# Patient Record
Sex: FEMALE | Race: BLACK OR AFRICAN AMERICAN | NOT HISPANIC OR LATINO | Employment: OTHER | URBAN - METROPOLITAN AREA
[De-identification: names, ages, dates, MRNs, and addresses within clinical notes are randomized per-mention and may not be internally consistent; named-entity substitution may affect disease eponyms.]

---

## 2023-02-24 ENCOUNTER — DOCUMENTATION (OUTPATIENT)
Dept: PRIMARY CARE | Facility: CLINIC | Age: 78
End: 2023-02-24
Payer: MEDICARE

## 2023-03-01 PROBLEM — H90.3 BILATERAL SENSORINEURAL HEARING LOSS: Status: ACTIVE | Noted: 2023-03-01

## 2023-03-01 PROBLEM — M54.2 CHRONIC NECK PAIN: Status: ACTIVE | Noted: 2023-03-01

## 2023-03-01 PROBLEM — E11.9 DIABETES MELLITUS (MULTI): Status: ACTIVE | Noted: 2023-03-01

## 2023-03-01 PROBLEM — R06.00 DYSPNEA: Status: ACTIVE | Noted: 2023-03-01

## 2023-03-01 PROBLEM — H25.812 COMBINED FORMS OF AGE-RELATED CATARACT, LEFT EYE: Status: ACTIVE | Noted: 2023-03-01

## 2023-03-01 PROBLEM — R79.9 ABNORMAL BLOOD CHEMISTRY: Status: ACTIVE | Noted: 2023-03-01

## 2023-03-01 PROBLEM — D64.9 ANEMIA: Status: ACTIVE | Noted: 2023-03-01

## 2023-03-01 PROBLEM — N17.9 AKI (ACUTE KIDNEY INJURY) (CMS-HCC): Status: ACTIVE | Noted: 2023-03-01

## 2023-03-01 PROBLEM — R93.1 ABNORMAL ECHOCARDIOGRAM: Status: ACTIVE | Noted: 2023-03-01

## 2023-03-01 PROBLEM — H61.23 BILATERAL IMPACTED CERUMEN: Status: ACTIVE | Noted: 2023-03-01

## 2023-03-01 PROBLEM — E11.42 DIABETIC PERIPHERAL NEUROPATHY (MULTI): Status: ACTIVE | Noted: 2023-03-01

## 2023-03-01 PROBLEM — G89.29 CHRONIC NECK PAIN: Status: ACTIVE | Noted: 2023-03-01

## 2023-03-01 PROBLEM — R45.89 DEPRESSED MOOD: Status: ACTIVE | Noted: 2023-03-01

## 2023-03-01 PROBLEM — G89.29 CHRONIC PAIN: Status: ACTIVE | Noted: 2023-03-01

## 2023-03-01 PROBLEM — M19.90 ARTHROSIS OF MULTIPLE SITES: Status: ACTIVE | Noted: 2023-03-01

## 2023-03-01 PROBLEM — F41.8 DEPRESSION WITH ANXIETY: Status: ACTIVE | Noted: 2023-03-01

## 2023-03-01 PROBLEM — R42 DIZZINESS: Status: ACTIVE | Noted: 2023-03-01
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
## 2023-03-01 PROBLEM — R05.9 COUGH: Status: ACTIVE | Noted: 2023-03-01

## 2023-03-01 PROBLEM — H25.813 COMBINED FORM OF AGE-RELATED CATARACT, BOTH EYES: Status: ACTIVE | Noted: 2023-03-01

## 2023-03-02 PROBLEM — G30.1 LATE ONSET ALZHEIMER'S DEMENTIA WITHOUT BEHAVIORAL DISTURBANCE (MULTI): Status: ACTIVE | Noted: 2023-03-02

## 2023-03-02 PROBLEM — H91.90 HEARING LOSS: Status: ACTIVE | Noted: 2023-03-02

## 2023-03-02 PROBLEM — M47.816 LUMBAR SPONDYLOSIS: Status: ACTIVE | Noted: 2023-03-02

## 2023-03-02 PROBLEM — H61.21 IMPACTED CERUMEN OF RIGHT EAR: Status: ACTIVE | Noted: 2023-03-02

## 2023-03-02 PROBLEM — L65.9 HAIR LOSS: Status: ACTIVE | Noted: 2023-03-02

## 2023-03-02 PROBLEM — Z96.1 PSEUDOPHAKIA OF BOTH EYES: Status: ACTIVE | Noted: 2023-03-02

## 2023-03-02 PROBLEM — M05.00: Status: ACTIVE | Noted: 2023-03-02

## 2023-03-02 PROBLEM — E78.5 HYPERLIPIDEMIA LDL GOAL <100: Status: ACTIVE | Noted: 2023-03-02

## 2023-03-02 PROBLEM — G62.9 NEUROPATHY: Status: ACTIVE | Noted: 2023-03-02

## 2023-03-02 PROBLEM — M85.89 OTHER SPECIFIED DISORDERS OF BONE DENSITY AND STRUCTURE, MULTIPLE SITES: Status: ACTIVE | Noted: 2023-03-02

## 2023-03-02 PROBLEM — E55.9 VITAMIN D DEFICIENCY: Status: ACTIVE | Noted: 2023-03-02

## 2023-03-02 PROBLEM — M48.062 LUMBAR STENOSIS WITH NEUROGENIC CLAUDICATION: Status: ACTIVE | Noted: 2023-03-02

## 2023-03-02 PROBLEM — R32 URINARY INCONTINENCE, UNSPECIFIED TYPE: Status: ACTIVE | Noted: 2023-03-02

## 2023-03-02 PROBLEM — F02.80 LATE ONSET ALZHEIMER'S DEMENTIA WITHOUT BEHAVIORAL DISTURBANCE (MULTI): Status: ACTIVE | Noted: 2023-03-02

## 2023-03-02 PROBLEM — M06.9 RHEUMATOID ARTHRITIS (MULTI): Status: ACTIVE | Noted: 2023-03-02

## 2023-03-02 PROBLEM — E78.5 HYPERLIPIDEMIA: Status: ACTIVE | Noted: 2023-03-02

## 2023-03-02 PROBLEM — B35.1 ONYCHOMYCOSIS: Status: ACTIVE | Noted: 2023-03-02

## 2023-03-02 PROBLEM — M79.672 LEFT FOOT PAIN: Status: ACTIVE | Noted: 2023-03-02

## 2023-03-02 PROBLEM — I10 HYPERTENSION: Status: ACTIVE | Noted: 2023-03-02

## 2023-03-02 PROBLEM — N18.2 STAGE 2 CHRONIC KIDNEY DISEASE: Status: ACTIVE | Noted: 2023-03-02

## 2023-03-02 PROBLEM — M96.1 LUMBAR POST-LAMINECTOMY SYNDROME: Status: ACTIVE | Noted: 2023-03-02

## 2023-03-02 PROBLEM — R61 EXCESSIVE SWEATING: Status: ACTIVE | Noted: 2023-03-02

## 2023-03-02 PROBLEM — M54.50 LOW BACK PAIN: Status: ACTIVE | Noted: 2023-03-02

## 2023-03-02 PROBLEM — E66.9 OBESITY: Status: ACTIVE | Noted: 2023-03-02

## 2023-03-02 PROBLEM — M25.552 LEFT HIP PAIN: Status: ACTIVE | Noted: 2023-03-02

## 2023-03-02 PROBLEM — M06.30 RHEUMATOID NODULES (MULTI): Status: ACTIVE | Noted: 2023-03-02

## 2023-03-02 PROBLEM — R29.898 LOWER EXTREMITY WEAKNESS: Status: ACTIVE | Noted: 2023-03-02

## 2023-03-02 PROBLEM — M54.17 LUMBOSACRAL NEURITIS: Status: ACTIVE | Noted: 2023-03-02

## 2023-03-02 PROBLEM — R21 RASH: Status: ACTIVE | Noted: 2023-03-02

## 2023-03-02 PROBLEM — R26.81 GAIT INSTABILITY: Status: ACTIVE | Noted: 2023-03-02

## 2023-03-02 PROBLEM — L98.9 NODULAR LESION ON SURFACE OF SKIN: Status: ACTIVE | Noted: 2023-03-02

## 2023-03-02 PROBLEM — M19.90 OSTEOARTHRITIS: Status: ACTIVE | Noted: 2023-03-02

## 2023-03-02 RX ORDER — MECLIZINE HCL 12.5 MG 12.5 MG/1
12.5 TABLET ORAL 3 TIMES DAILY PRN
COMMUNITY
Start: 2022-04-06 | End: 2023-04-04 | Stop reason: SDUPTHER

## 2023-03-02 RX ORDER — SERTRALINE HYDROCHLORIDE 100 MG/1
1 TABLET, FILM COATED ORAL DAILY
COMMUNITY
Start: 2012-07-23 | End: 2023-03-14 | Stop reason: SDUPTHER

## 2023-03-02 RX ORDER — GABAPENTIN 300 MG/1
1 CAPSULE ORAL 3 TIMES DAILY
COMMUNITY
Start: 2022-11-18

## 2023-03-02 RX ORDER — AMLODIPINE BESYLATE 10 MG/1
1 TABLET ORAL DAILY
COMMUNITY
Start: 2022-11-18 | End: 2024-01-08 | Stop reason: SDUPTHER

## 2023-03-02 RX ORDER — HYDROXYCHLOROQUINE SULFATE 200 MG/1
1 TABLET, FILM COATED ORAL
COMMUNITY
Start: 2012-05-08 | End: 2024-01-08 | Stop reason: SDUPTHER

## 2023-03-02 RX ORDER — BENZONATATE 100 MG/1
100 CAPSULE ORAL
COMMUNITY
Start: 2022-12-12 | End: 2024-03-04 | Stop reason: ALTCHOICE

## 2023-03-02 RX ORDER — METOPROLOL TARTRATE 25 MG/1
1 TABLET, FILM COATED ORAL 2 TIMES DAILY
COMMUNITY
Start: 2017-11-16 | End: 2023-11-21 | Stop reason: SDUPTHER

## 2023-03-02 RX ORDER — METFORMIN HYDROCHLORIDE 500 MG/1
1 TABLET ORAL DAILY
COMMUNITY
Start: 2019-03-13 | End: 2023-04-04 | Stop reason: SDUPTHER

## 2023-03-02 RX ORDER — OXYBUTYNIN CHLORIDE 5 MG/1
1 TABLET, EXTENDED RELEASE ORAL DAILY
COMMUNITY
Start: 2022-06-27 | End: 2024-01-08 | Stop reason: SDUPTHER

## 2023-03-02 RX ORDER — BLOOD-GLUCOSE METER
KIT MISCELLANEOUS
COMMUNITY
Start: 2022-05-09 | End: 2024-03-19 | Stop reason: SDDI

## 2023-03-02 RX ORDER — LEFLUNOMIDE 10 MG/1
1 TABLET ORAL DAILY
COMMUNITY
Start: 2017-02-08 | End: 2024-03-04 | Stop reason: ALTCHOICE

## 2023-03-02 RX ORDER — ALBUTEROL SULFATE 90 UG/1
2 AEROSOL, METERED RESPIRATORY (INHALATION) 4 TIMES DAILY PRN
COMMUNITY
Start: 2022-07-01 | End: 2024-01-08 | Stop reason: SDUPTHER

## 2023-03-02 RX ORDER — ATORVASTATIN CALCIUM 40 MG/1
1 TABLET, FILM COATED ORAL EVERY EVENING
COMMUNITY
Start: 2016-08-15 | End: 2023-07-06 | Stop reason: SDUPTHER

## 2023-03-06 ENCOUNTER — SOCIAL WORK (OUTPATIENT)
Dept: PRIMARY CARE | Facility: CLINIC | Age: 78
End: 2023-03-06
Payer: MEDICARE

## 2023-03-06 ASSESSMENT — ANXIETY QUESTIONNAIRES
6. BECOMING EASILY ANNOYED OR IRRITABLE: SEVERAL DAYS
IF YOU CHECKED OFF ANY PROBLEMS ON THIS QUESTIONNAIRE, HOW DIFFICULT HAVE THESE PROBLEMS MADE IT FOR YOU TO DO YOUR WORK, TAKE CARE OF THINGS AT HOME, OR GET ALONG WITH OTHER PEOPLE: SOMEWHAT DIFFICULT
4. TROUBLE RELAXING: NOT AT ALL
2. NOT BEING ABLE TO STOP OR CONTROL WORRYING: SEVERAL DAYS
7. FEELING AFRAID AS IF SOMETHING AWFUL MIGHT HAPPEN: SEVERAL DAYS
3. WORRYING TOO MUCH ABOUT DIFFERENT THINGS: SEVERAL DAYS
GAD7 TOTAL SCORE: 5
1. FEELING NERVOUS, ANXIOUS, OR ON EDGE: SEVERAL DAYS
5. BEING SO RESTLESS THAT IT IS HARD TO SIT STILL: NOT AT ALL

## 2023-03-06 ASSESSMENT — PATIENT HEALTH QUESTIONNAIRE - PHQ9
SUM OF ALL RESPONSES TO PHQ QUESTIONS 1-9: 9
1. LITTLE INTEREST OR PLEASURE IN DOING THINGS: SEVERAL DAYS
3. TROUBLE FALLING OR STAYING ASLEEP: SEVERAL DAYS
2. FEELING DOWN, DEPRESSED OR HOPELESS: MORE THAN HALF THE DAYS
8. MOVING OR SPEAKING SO SLOWLY THAT OTHER PEOPLE COULD HAVE NOTICED. OR THE OPPOSITE, BEING SO FIGETY OR RESTLESS THAT YOU HAVE BEEN MOVING AROUND A LOT MORE THAN USUAL: SEVERAL DAYS
SUM OF ALL RESPONSES TO PHQ9 QUESTIONS 1 & 2: 3
6. FEELING BAD ABOUT YOURSELF - OR THAT YOU ARE A FAILURE OR HAVE LET YOURSELF OR YOUR FAMILY DOWN: SEVERAL DAYS
4. FEELING TIRED OR HAVING LITTLE ENERGY: SEVERAL DAYS
10. IF YOU CHECKED OFF ANY PROBLEMS, HOW DIFFICULT HAVE THESE PROBLEMS MADE IT FOR YOU TO DO YOUR WORK, TAKE CARE OF THINGS AT HOME, OR GET ALONG WITH OTHER PEOPLE: SOMEWHAT DIFFICULT
5. POOR APPETITE OR OVEREATING: SEVERAL DAYS
9. THOUGHTS THAT YOU WOULD BE BETTER OFF DEAD, OR OF HURTING YOURSELF: NOT AT ALL
7. TROUBLE CONCENTRATING ON THINGS, SUCH AS READING THE NEWSPAPER OR WATCHING TELEVISION: SEVERAL DAYS
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
## 2023-03-14 DIAGNOSIS — F41.9 ANXIETY: Primary | ICD-10-CM

## 2023-03-14 DIAGNOSIS — F41.8 DEPRESSION WITH ANXIETY: Primary | ICD-10-CM

## 2023-03-14 RX ORDER — SERTRALINE HYDROCHLORIDE 100 MG/1
100 TABLET, FILM COATED ORAL DAILY
Qty: 90 TABLET | Refills: 1 | Status: SHIPPED | OUTPATIENT
Start: 2023-03-14 | End: 2024-01-08 | Stop reason: SDUPTHER

## 2023-03-16 ENCOUNTER — DOCUMENTATION (OUTPATIENT)
Dept: BEHAVIORAL HEALTH | Facility: CLINIC | Age: 78
End: 2023-03-16
Payer: MEDICARE

## 2023-03-16 ENCOUNTER — TELEPHONE (OUTPATIENT)
Dept: PRIMARY CARE | Facility: CLINIC | Age: 78
End: 2023-03-16
Payer: MEDICARE

## 2023-03-16 NOTE — PROGRESS NOTES
[facility] outreach to patient for update on program. Patient to continue with CCM RN,Sasha and Patient Navigator Bev.

## 2023-03-16 NOTE — PROGRESS NOTES
[facility] Psychiatry Consult Note     Toyin Oropeza is a 78 y.o., referred to Collaborative Care for symptoms of depression and anxiety. I have reviewed the patient with the behavioral health manager and reviewed the patient's electronic record.    Recommendations:   Pt with advancing medical problems and unspecified dementia, unable to care for self at home. Needs help with ADLs. While she does have high distress in this context, her level of distress seems appropriate to the situation. Agree with case management for assistance with living situation, but no role for psychiatry or collaborative care at this time. Pharmacologic intervention would be more likely to complicate the situation in this context.      Patient Health Questionnaire-9 Score: 9 (3/6/2023  1:50 PM)  GABRIELLA-7 Total Score: 5 (3/6/2023  1:48 PM)      The above treatment considerations and suggestions are based on consultations with the patient's care manager and a review of information available in the electronic medical record. I have not personally examined the patient. All recommendations should be implemented with consideration of the patient's relevant prior history and current clinical status. Please feel free to call me with any questions about the care of this patient. I can easily be reached through [employer], or office email

## 2023-03-17 ENCOUNTER — TELEPHONE (OUTPATIENT)
Dept: PRIMARY CARE | Facility: CLINIC | Age: 78
End: 2023-03-17
Payer: MEDICARE

## 2023-03-21 ENCOUNTER — APPOINTMENT (OUTPATIENT)
Dept: PRIMARY CARE | Facility: CLINIC | Age: 78
End: 2023-03-21
Payer: MEDICARE

## 2023-03-22 ENCOUNTER — TELEPHONE (OUTPATIENT)
Dept: PRIMARY CARE | Facility: CLINIC | Age: 78
End: 2023-03-22
Payer: MEDICARE

## 2023-03-23 ENCOUNTER — PATIENT OUTREACH (OUTPATIENT)
Dept: PRIMARY CARE | Facility: CLINIC | Age: 78
End: 2023-03-23
Payer: MEDICARE

## 2023-03-23 ENCOUNTER — PATIENT OUTREACH (OUTPATIENT)
Dept: CARE COORDINATION | Facility: CLINIC | Age: 78
End: 2023-03-23

## 2023-03-23 DIAGNOSIS — E78.5 HYPERLIPIDEMIA, UNSPECIFIED HYPERLIPIDEMIA TYPE: ICD-10-CM

## 2023-03-23 DIAGNOSIS — I10 ESSENTIAL HYPERTENSION, BENIGN: ICD-10-CM

## 2023-03-23 DIAGNOSIS — E11.9 TYPE 2 DIABETES MELLITUS WITHOUT COMPLICATION, UNSPECIFIED WHETHER LONG TERM INSULIN USE (MULTI): ICD-10-CM

## 2023-03-23 PROCEDURE — 99490 CHRNC CARE MGMT STAFF 1ST 20: CPT | Performed by: STUDENT IN AN ORGANIZED HEALTH CARE EDUCATION/TRAINING PROGRAM

## 2023-03-23 PROCEDURE — 99439 CHRNC CARE MGMT STAF EA ADDL: CPT | Performed by: STUDENT IN AN ORGANIZED HEALTH CARE EDUCATION/TRAINING PROGRAM

## 2023-03-23 NOTE — PROGRESS NOTES
Called [employer] to reopen case and reschedule assessment.  Called Ms. Sandip to follow up she said that she did agree to keep her assessment appt. For 3/30/23.

## 2023-04-04 DIAGNOSIS — E11.9 TYPE 2 DIABETES MELLITUS WITHOUT COMPLICATION, UNSPECIFIED WHETHER LONG TERM INSULIN USE (MULTI): Primary | ICD-10-CM

## 2023-04-04 DIAGNOSIS — R42 DIZZINESS: ICD-10-CM

## 2023-04-05 ENCOUNTER — PATIENT OUTREACH (OUTPATIENT)
Dept: CARE COORDINATION | Facility: CLINIC | Age: 78
End: 2023-04-05
Payer: MEDICARE

## 2023-04-05 NOTE — PROGRESS NOTES
Spoke with Ms. Oropeza today regarding her Assisted living assessment she had with the [facility]. Ms. Oropeza now wants [facility] in-home care services instead of Assisted living. I called the intake dept at [facility] to see if that can be switched over for her. Flora at [facility] said she would work on the switch,  and reach out to Ms. Oropeza to let her know the next steps.

## 2023-04-06 RX ORDER — METFORMIN HYDROCHLORIDE 500 MG/1
500 TABLET ORAL DAILY
Qty: 90 TABLET | Refills: 1 | Status: SHIPPED | OUTPATIENT
Start: 2023-04-06 | End: 2024-01-08 | Stop reason: SDUPTHER

## 2023-04-06 RX ORDER — MECLIZINE HCL 12.5 MG 12.5 MG/1
12.5 TABLET ORAL 3 TIMES DAILY PRN
Qty: 90 TABLET | Refills: 1 | Status: SHIPPED | OUTPATIENT
Start: 2023-04-06 | End: 2024-01-30 | Stop reason: SDUPTHER

## 2023-04-07 ENCOUNTER — DOCUMENTATION (OUTPATIENT)
Dept: PRIMARY CARE | Facility: CLINIC | Age: 78
End: 2023-04-07
Payer: MEDICARE

## 2023-04-07 DIAGNOSIS — F32.A DEPRESSION, UNSPECIFIED DEPRESSION TYPE: Primary | ICD-10-CM

## 2023-04-07 PROCEDURE — G2214 INIT/SUB PSYCH CARE M 1ST 30: HCPCS | Performed by: STUDENT IN AN ORGANIZED HEALTH CARE EDUCATION/TRAINING PROGRAM

## 2023-04-12 ENCOUNTER — PATIENT OUTREACH (OUTPATIENT)
Dept: CARE COORDINATION | Facility: CLINIC | Age: 78
End: 2023-04-12
Payer: MEDICARE

## 2023-04-12 NOTE — PROGRESS NOTES
Ms. Oropeza called in today, asked if I could contact [employer] (in home services) to check the status of her case.   I called and left a message with the intake dept. to call me back.

## 2023-04-13 ENCOUNTER — PATIENT OUTREACH (OUTPATIENT)
Dept: CARE COORDINATION | Facility: CLINIC | Age: 78
End: 2023-04-13
Payer: MEDICARE

## 2023-04-13 ENCOUNTER — PATIENT OUTREACH (OUTPATIENT)
Dept: PRIMARY CARE | Facility: CLINIC | Age: 78
End: 2023-04-13
Payer: MEDICARE

## 2023-04-13 DIAGNOSIS — I10 ESSENTIAL HYPERTENSION, BENIGN: ICD-10-CM

## 2023-04-13 DIAGNOSIS — F32.A DEPRESSION, UNSPECIFIED DEPRESSION TYPE: ICD-10-CM

## 2023-04-13 DIAGNOSIS — E78.5 HYPERLIPIDEMIA, UNSPECIFIED HYPERLIPIDEMIA TYPE: ICD-10-CM

## 2023-04-13 DIAGNOSIS — E11.9 TYPE 2 DIABETES MELLITUS WITHOUT COMPLICATION, UNSPECIFIED WHETHER LONG TERM INSULIN USE (MULTI): ICD-10-CM

## 2023-04-13 NOTE — PROGRESS NOTES
"Spoke w/ pt  Pt identified by name and     LOV: 23, /85, HR 78, A1C 5.5%  NOV: 23    Treatment Goals    For high blood pressure:   Treatment goals include:  [place]/continue prudent diet and regular exercise.   Blood Pressure Treatment goals include:   BP of 120/80, with no higher than 140/85 on consistent basis.   Exercise at least 3-4 days a week for at least 30 minutes  Eat a balanced diet, rich in fruits and vegetables, low in sodium and processed foods.  If you are overweight, work toward a goal BMI of less than 25, with short-term goal of 10 pound weight loss.    For diabetes:   Treatment goals include:  HgbA1C less than 7.0%  Fasting B - 130 mg/dL  Postprandial BG: less than 180 mg/dL  /80 on consistent basis, with no higher than 140/85  LDL cholesterol less than 100, and HDL cholesterol above 40.  Yearly retinal exam  Check feet periodically for sores or decreased sensation  Exercise at least 3-4 days a week for at least 30 minutes  Work toward a goal BMI of less than 25, although in the shorter term, losing even 10 pounds will help.     For Cholesterol:   Treatment goals include:  HIGHER IN FRUITS AND VEGGIES AND WHOLE GRAIN AND LOWER IN FATTY FOODS.     Think about those things which may be affecting your ability to reach those goals, and develop a plan to overcome them.    Additional resources are available upon request to help you attain goals, including dietitian.    Informed pt, Kofijudith has been trying to reach her to discuss her [employer] Home [facility] Intake information and let her know that she needs to rtn documents asap, please rtn call to Bev at [phone] to discuss in greater detail.\"     Pt states \"she is feeling well w/ no complaints today, she was taking a nap when Bev called and happy she called, she is going to call her right now and confirms she has her number, she is taking all of her medications and no RF needed at this time, appetite has been ok, her " "brother helps her with food.\"    Thanked Ms. Oropeza for her time to speak w/ me today and informed her that I am happy to assist if she has any questions or concerns.    Pt verbalizes understanding and agrees w/ plan of care.    Sasha Read RN      "

## 2023-04-13 NOTE — PROGRESS NOTES
I spoke with Ms. Oropeza yesterday she called me to assist regarding her [employer]  in- home care application. I called [employer] intake to check on this. I was told the application is in progress. [employer] requires she apply for Medicaid through the [facility] which she has done, but they need to have her verification list returned to them along with the supporting documentation they request by mail. I called Ms. Oropeza today to stress the importance of returning these documents when she receives them, she did not answer and her VM is full. I follow up in a couple days.   I also sent a message to the Nurse MGR Baez to follow up with Ms. Oropeza also.    FYI she has been referred by the [employer] intake  to Leonel Burgos for mental health support.

## 2023-04-14 ENCOUNTER — PATIENT OUTREACH (OUTPATIENT)
Dept: PRIMARY CARE | Facility: CLINIC | Age: 78
End: 2023-04-14
Payer: MEDICARE

## 2023-04-14 DIAGNOSIS — I10 ESSENTIAL HYPERTENSION, BENIGN: ICD-10-CM

## 2023-04-14 DIAGNOSIS — E11.9 TYPE 2 DIABETES MELLITUS WITHOUT COMPLICATION, UNSPECIFIED WHETHER LONG TERM INSULIN USE (MULTI): ICD-10-CM

## 2023-04-14 NOTE — PROGRESS NOTES
"Spoke w/ pt  Pt identified by name and      Pt is crying w/ a shaky voice and states \"she is having a bad headache and confusion, she fell and hit her head on the left side this morning, she is having a lot of confusion, she is trying to piece everything together, she went to Dr. D'Amico for her visit yesterday, she can't remember what happened, she is real shaky, she dropped her hot coffee on the floor.\"     She can tell me her full name, , address, day and time, however she admits that \"she is shaky and having a lot of confusion, she has not taken her medication today because she is shaking too bad, cannot check her BS due to she is too shaky, she last ate- last night, a can of peaches and nothing since then, she cannot get her phone to call out or her tv to turn on, she can't remember what Dr. D'Amico told her yesterday when she went to his office.\"     Attempt made to reach pt's brother Antonio (EC) at [phone] and her sister Anat (EC) at [phone] both did not answer.      Advised pt: seek immediate medical attention now 911 and she should stay on the phone w/ me while I call 911 to send them to her address.     Pt verbalizes understanding and agrees w/ plan.     911 called and pt remained on the phone w/ me while I called 911, 911 arrived to pt, SBAR given to 911 and they informed me they agree w/ plan to take pt to the ED now.     Dr. D'Amico notified.     Sasha Read RN     "

## 2023-04-14 NOTE — PROGRESS NOTES
"Spoke w/ pt  Pt identified by name and     Pt is crying w/ a shaky voice and states \"she is having a bad headache and confusion, she fell and hit her head on the left side this morning, she is having a lot of confusion, she is trying to piece everything together, she went to Dr. D'Amico for her visit yesterday, she can't remember what happened, she is real shaky, she dropped her hot coffee on the floor.\"    She can tell me her full name, , address, day and time, however she admits that \"she is shaky and having a lot of confusion, she has not taken her medication today because she is shaking too bad, cannot check her BS due to she is too shaky, she last ate- last night, a can of peaches and nothing since then, she cannot get her phone to call out or her tv to turn on, she can't remember what Dr. D'Amico told her yesterday when she went to his office.\"    Attempt made to reach pt's brother Antonio (EC) at [phone] and her sister Anat (EC) at [phone] both did not answer.     Advised pt: seek immediate medical attention now 911 and she should stay on the phone w/ me while I call 911 to send them to her address.    Pt verbalizes understanding and agrees w/ plan.    911 called and pt remained on the phone w/ me while I called 911, 911 arrived to pt, SBAR given to 911 and they informed me they agree w/ plan to take pt to the ED now.    Dr. D'Amico notified.    Sasha Read RN    "

## 2023-04-17 ENCOUNTER — DOCUMENTATION (OUTPATIENT)
Dept: PRIMARY CARE | Facility: CLINIC | Age: 78
End: 2023-04-17
Payer: MEDICARE

## 2023-04-17 ENCOUNTER — PATIENT OUTREACH (OUTPATIENT)
Dept: PRIMARY CARE | Facility: CLINIC | Age: 78
End: 2023-04-17
Payer: MEDICARE

## 2023-04-17 DIAGNOSIS — E11.9 TYPE 2 DIABETES MELLITUS WITHOUT COMPLICATION, UNSPECIFIED WHETHER LONG TERM INSULIN USE (MULTI): ICD-10-CM

## 2023-04-17 DIAGNOSIS — I10 ESSENTIAL HYPERTENSION, BENIGN: ICD-10-CM

## 2023-04-17 NOTE — PROGRESS NOTES
"Discharge facility: [facility]  Discharge diagnosis: UTI  Admission date: 4/14/2023   Discharge date:  4/16/2023    First attempt made to reach pt at pt's listed contact number on 4/17/23 and recording states \"this mailbox is not accepting VM.\"    Will  continue to try to reach pt for TCM/CCM outreach.    Sasha Read RN      "

## 2023-04-18 NOTE — PROGRESS NOTES
"Discharge facility: [facility]  Discharge diagnosis: UTI  Admission date: 4/14/2023   Discharge date:  4/16/2023    Second attempt made to reach pt at pt's listed contact number on 4/18/23 and recording states \"this mailbox is not accepting VM.\"    Sasha Read RN  "

## 2023-04-19 ENCOUNTER — PATIENT OUTREACH (OUTPATIENT)
Dept: PRIMARY CARE | Facility: CLINIC | Age: 78
End: 2023-04-19
Payer: MEDICARE

## 2023-04-19 NOTE — PROGRESS NOTES
Discharge facility: [facility]  Discharge diagnosis: Weakness, UTI   Admission date: 4/14/23  Discharge date: 4/19/23 to SNF

## 2023-05-08 ENCOUNTER — PATIENT OUTREACH (OUTPATIENT)
Dept: PRIMARY CARE | Facility: CLINIC | Age: 78
End: 2023-05-08
Payer: MEDICARE

## 2023-05-08 DIAGNOSIS — I10 ESSENTIAL HYPERTENSION, BENIGN: ICD-10-CM

## 2023-05-08 DIAGNOSIS — E11.9 TYPE 2 DIABETES MELLITUS WITHOUT COMPLICATION, UNSPECIFIED WHETHER LONG TERM INSULIN USE (MULTI): ICD-10-CM

## 2023-05-08 NOTE — PROGRESS NOTES
LOV: 23, /85, HR 78, A1C 5.5%  NOV: 23     Treatment Goals     For high blood pressure:   Treatment goals include:  [place]/continue prudent diet and regular exercise.   Blood Pressure Treatment goals include:   BP of 120/80, with no higher than 140/85 on consistent basis.   Exercise at least 3-4 days a week for at least 30 minutes  Eat a balanced diet, rich in fruits and vegetables, low in sodium and processed foods.  If you are overweight, work toward a goal BMI of less than 25, with short-term goal of 10 pound weight loss.     For diabetes:   Treatment goals include:  HgbA1C less than 7.0%  Fasting B - 130 mg/dL  Postprandial BG: less than 180 mg/dL  /80 on consistent basis, with no higher than 140/85  LDL cholesterol less than 100, and HDL cholesterol above 40.  Yearly retinal exam  Check feet periodically for sores or decreased sensation  Exercise at least 3-4 days a week for at least 30 minutes  Work toward a goal BMI of less than 25, although in the shorter term, losing even 10 pounds will help.     For Cholesterol:   Treatment goals include:  HIGHER IN FRUITS AND VEGGIES AND WHOLE GRAIN AND LOWER IN FATTY FOODS.      Think about those things which may be affecting your ability to reach those goals, and develop a plan to overcome them.     Additional resources are available upon request to help you attain goals, including dietitian.     Sasha Read RN

## 2023-05-19 DIAGNOSIS — E51.9 THIAMINE DEFICIENCY: Primary | ICD-10-CM

## 2023-05-23 ENCOUNTER — TELEPHONE (OUTPATIENT)
Dept: PRIMARY CARE | Facility: CLINIC | Age: 78
End: 2023-05-23
Payer: MEDICARE

## 2023-05-23 NOTE — TELEPHONE ENCOUNTER
----- Message from Christopher D'Amico, DO sent at 5/19/2023  8:55 AM EDT -----  Labs were sent to me from [facility] showing low thiamine.  I assume this is from the hospital visits, and they hopefully repleted.  I put in an order for repeat lab.

## 2023-05-24 ENCOUNTER — TELEPHONE (OUTPATIENT)
Dept: PRIMARY CARE | Facility: CLINIC | Age: 78
End: 2023-05-24
Payer: MEDICARE

## 2023-05-24 NOTE — TELEPHONE ENCOUNTER
----- Message from Christopher D'Amico, DO sent at 5/19/2023  8:55 AM EDT -----  Labs were sent to me from [facility] showing low thiamine.  I assume this is from the hospital visits, and they hopefully repleted.  I put in an order for repeat lab.

## 2023-05-31 NOTE — PROGRESS NOTES
Chart review for TCM update.  Adm: 5/24/23, dx: change in mental status  DC to SNF [facility] on 5/30/23    Will not identify into TCM since dc to SNF.    Will continue to review for follow up.    Sasha Read RN

## 2023-06-08 ENCOUNTER — PATIENT OUTREACH (OUTPATIENT)
Dept: PRIMARY CARE | Facility: CLINIC | Age: 78
End: 2023-06-08
Payer: MEDICARE

## 2023-06-08 DIAGNOSIS — E11.9 TYPE 2 DIABETES MELLITUS WITHOUT COMPLICATION, UNSPECIFIED WHETHER LONG TERM INSULIN USE (MULTI): ICD-10-CM

## 2023-06-08 DIAGNOSIS — I10 ESSENTIAL HYPERTENSION, BENIGN: ICD-10-CM

## 2023-06-08 DIAGNOSIS — E78.5 HYPERLIPIDEMIA, UNSPECIFIED HYPERLIPIDEMIA TYPE: ICD-10-CM

## 2023-06-08 NOTE — PROGRESS NOTES
Discharged from [facility] on 23 to [facility]    LOV: 23, /85, HR 78, A1C 5.5%  NOV: 23     Treatment Goals     For high blood pressure:   Treatment goals include:  [place]/continue prudent diet and regular exercise.   Blood Pressure Treatment goals include:   BP of 120/80, with no higher than 140/85 on consistent basis.   Exercise at least 3-4 days a week for at least 30 minutes  Eat a balanced diet, rich in fruits and vegetables, low in sodium and processed foods.  If you are overweight, work toward a goal BMI of less than 25, with short-term goal of 10 pound weight loss.     For diabetes:   Treatment goals include:  HgbA1C less than 7.0%  Fasting B - 130 mg/dL  Postprandial BG: less than 180 mg/dL  /80 on consistent basis, with no higher than 140/85  LDL cholesterol less than 100, and HDL cholesterol above 40.  Yearly retinal exam  Check feet periodically for sores or decreased sensation  Exercise at least 3-4 days a week for at least 30 minutes  Work toward a goal BMI of less than 25, although in the shorter term, losing even 10 pounds will help.     For Cholesterol:   Treatment goals include:  HIGHER IN FRUITS AND VEGGIES AND WHOLE GRAIN AND LOWER IN FATTY FOODS.      Think about those things which may be affecting your ability to reach those goals, and develop a plan to overcome them.     Additional resources are available upon request to help you attain goals, including dietitian.     Sasha Read RN

## 2023-06-09 ENCOUNTER — APPOINTMENT (OUTPATIENT)
Dept: PRIMARY CARE | Facility: CLINIC | Age: 78
End: 2023-06-09
Payer: MEDICARE

## 2023-07-06 DIAGNOSIS — E78.00 HYPERCHOLESTEROLEMIA: ICD-10-CM

## 2023-07-06 RX ORDER — ATORVASTATIN CALCIUM 40 MG/1
40 TABLET, FILM COATED ORAL EVERY EVENING
Qty: 90 TABLET | Refills: 2 | Status: SHIPPED | OUTPATIENT
Start: 2023-07-06 | End: 2024-01-08 | Stop reason: SDUPTHER

## 2023-07-25 ENCOUNTER — PATIENT OUTREACH (OUTPATIENT)
Dept: PRIMARY CARE | Facility: CLINIC | Age: 78
End: 2023-07-25
Payer: MEDICARE

## 2023-07-25 DIAGNOSIS — I10 ESSENTIAL HYPERTENSION, BENIGN: ICD-10-CM

## 2023-07-25 DIAGNOSIS — E11.9 TYPE 2 DIABETES MELLITUS WITHOUT COMPLICATION, UNSPECIFIED WHETHER LONG TERM INSULIN USE (MULTI): ICD-10-CM

## 2023-07-25 DIAGNOSIS — E78.5 HYPERLIPIDEMIA, UNSPECIFIED HYPERLIPIDEMIA TYPE: ICD-10-CM

## 2023-07-25 NOTE — PROGRESS NOTES
Chart Review:  Upon chart review via [facility] Ms. Deal is still active x56 days at [facility].  Will continue to follow for [facility] outreach.    LOV: 23, /85, HR 78, A1C 5.5%     Treatment Goals     For high blood pressure:   Treatment goals include:  [place]/continue prudent diet and regular exercise.   Blood Pressure Treatment goals include:   BP of 120/80, with no higher than 140/85 on consistent basis.   Exercise at least 3-4 days a week for at least 30 minutes  Eat a balanced diet, rich in fruits and vegetables, low in sodium and processed foods.  If you are overweight, work toward a goal BMI of less than 25, with short-term goal of 10 pound weight loss.     For diabetes:   Treatment goals include:  HgbA1C less than 7.0%  Fasting B - 130 mg/dL  Postprandial BG: less than 180 mg/dL  /80 on consistent basis, with no higher than 140/85  LDL cholesterol less than 100, and HDL cholesterol above 40.  Yearly retinal exam  Check feet periodically for sores or decreased sensation  Exercise at least 3-4 days a week for at least 30 minutes  Work toward a goal BMI of less than 25, although in the shorter term, losing even 10 pounds will help.     For Cholesterol:   Treatment goals include:  HIGHER IN FRUITS AND VEGGIES AND WHOLE GRAIN AND LOWER IN FATTY FOODS.      Think about those things which may be affecting your ability to reach those goals, and develop a plan to overcome them.     Additional resources are available upon request to help you attain goals, including dietitian.     Sasha Read RN

## 2023-08-22 ENCOUNTER — PATIENT OUTREACH (OUTPATIENT)
Dept: PRIMARY CARE | Facility: CLINIC | Age: 78
End: 2023-08-22
Payer: MEDICARE

## 2023-08-22 DIAGNOSIS — I10 ESSENTIAL HYPERTENSION, BENIGN: ICD-10-CM

## 2023-08-22 DIAGNOSIS — E11.9 TYPE 2 DIABETES MELLITUS WITHOUT COMPLICATION, UNSPECIFIED WHETHER LONG TERM INSULIN USE (MULTI): ICD-10-CM

## 2023-08-22 DIAGNOSIS — E78.5 HYPERLIPIDEMIA, UNSPECIFIED HYPERLIPIDEMIA TYPE: ICD-10-CM

## 2023-08-22 NOTE — PROGRESS NOTES
Chart Review:  Upon chart review via [facility] Ms. Deal is still active x84 days at [facility].  Will continue to follow for [facility] outreach.     LOV: 23, /85, HR 78, A1C 5.5%     Treatment Goals     For high blood pressure:   Treatment goals include:  [place]/continue prudent diet and regular exercise.   Blood Pressure Treatment goals include:   BP of 120/80, with no higher than 140/85 on consistent basis.   Exercise at least 3-4 days a week for at least 30 minutes  Eat a balanced diet, rich in fruits and vegetables, low in sodium and processed foods.  If you are overweight, work toward a goal BMI of less than 25, with short-term goal of 10 pound weight loss.     For diabetes:   Treatment goals include:  HgbA1C less than 7.0%  Fasting B - 130 mg/dL  Postprandial BG: less than 180 mg/dL  /80 on consistent basis, with no higher than 140/85  LDL cholesterol less than 100, and HDL cholesterol above 40.  Yearly retinal exam  Check feet periodically for sores or decreased sensation  Exercise at least 3-4 days a week for at least 30 minutes  Work toward a goal BMI of less than 25, although in the shorter term, losing even 10 pounds will help.     For Cholesterol:   Treatment goals include:  HIGHER IN FRUITS AND VEGGIES AND WHOLE GRAIN AND LOWER IN FATTY FOODS.      Think about those things which may be affecting your ability to reach those goals, and develop a plan to overcome them.     Additional resources are available upon request to help you attain goals, including dietitian.     Sasha Read RN   no

## 2023-09-22 ENCOUNTER — PATIENT OUTREACH (OUTPATIENT)
Dept: PRIMARY CARE | Facility: CLINIC | Age: 78
End: 2023-09-22
Payer: MEDICARE

## 2023-09-22 DIAGNOSIS — E78.5 HYPERLIPIDEMIA, UNSPECIFIED HYPERLIPIDEMIA TYPE: ICD-10-CM

## 2023-09-22 DIAGNOSIS — I10 ESSENTIAL HYPERTENSION, BENIGN: ICD-10-CM

## 2023-09-22 DIAGNOSIS — E11.9 TYPE 2 DIABETES MELLITUS WITHOUT COMPLICATION, UNSPECIFIED WHETHER LONG TERM INSULIN USE (MULTI): ICD-10-CM

## 2023-09-22 NOTE — PROGRESS NOTES
Chart Review:  Upon chart review via [facility] Ms. Deal is still active at [facility].  Will continue to follow for [facility] outreach.     LOV: 23, /85, HR 78, A1C 5.5%     Treatment Goals     For high blood pressure:   Treatment goals include:  [place]/continue prudent diet and regular exercise.   Blood Pressure Treatment goals include:   BP of 120/80, with no higher than 140/85 on consistent basis.   Exercise at least 3-4 days a week for at least 30 minutes  Eat a balanced diet, rich in fruits and vegetables, low in sodium and processed foods.  If you are overweight, work toward a goal BMI of less than 25, with short-term goal of 10 pound weight loss.     For diabetes:   Treatment goals include:  HgbA1C less than 7.0%  Fasting B - 130 mg/dL  Postprandial BG: less than 180 mg/dL  /80 on consistent basis, with no higher than 140/85  LDL cholesterol less than 100, and HDL cholesterol above 40.  Yearly retinal exam  Check feet periodically for sores or decreased sensation  Exercise at least 3-4 days a week for at least 30 minutes  Work toward a goal BMI of less than 25, although in the shorter term, losing even 10 pounds will help.     For Cholesterol:   Treatment goals include:  HIGHER IN FRUITS AND VEGGIES AND WHOLE GRAIN AND LOWER IN FATTY FOODS.      Think about those things which may be affecting your ability to reach those goals, and develop a plan to overcome them.     Additional resources are available upon request to help you attain goals, including dietitian.     Sasha Read RN

## 2023-10-24 ENCOUNTER — PATIENT OUTREACH (OUTPATIENT)
Dept: PRIMARY CARE | Facility: CLINIC | Age: 78
End: 2023-10-24
Payer: MEDICARE

## 2023-10-24 DIAGNOSIS — E11.9 TYPE 2 DIABETES MELLITUS WITHOUT COMPLICATION, UNSPECIFIED WHETHER LONG TERM INSULIN USE (MULTI): ICD-10-CM

## 2023-10-24 DIAGNOSIS — I10 ESSENTIAL HYPERTENSION, BENIGN: ICD-10-CM

## 2023-10-24 DIAGNOSIS — E78.5 HYPERLIPIDEMIA, UNSPECIFIED HYPERLIPIDEMIA TYPE: ICD-10-CM

## 2023-10-24 NOTE — PROGRESS NOTES
Chart Review:  Upon chart review via [facility] Ms. Deal is still active at [facility].  Will continue to follow for [facility] outreach.     LOV: 23, /85, HR 78, A1C 5.5%     Treatment Goals     For high blood pressure:   Treatment goals include:  [place]/continue prudent diet and regular exercise.   Blood Pressure Treatment goals include:   BP of 120/80, with no higher than 140/85 on consistent basis.   Exercise at least 3-4 days a week for at least 30 minutes  Eat a balanced diet, rich in fruits and vegetables, low in sodium and processed foods.  If you are overweight, work toward a goal BMI of less than 25, with short-term goal of 10 pound weight loss.     For diabetes:   Treatment goals include:  HgbA1C less than 7.0%  Fasting B - 130 mg/dL  Postprandial BG: less than 180 mg/dL  /80 on consistent basis, with no higher than 140/85  LDL cholesterol less than 100, and HDL cholesterol above 40.  Yearly retinal exam  Check feet periodically for sores or decreased sensation  Exercise at least 3-4 days a week for at least 30 minutes  Work toward a goal BMI of less than 25, although in the shorter term, losing even 10 pounds will help.     For Cholesterol:   Treatment goals include:  HIGHER IN FRUITS AND VEGGIES AND WHOLE GRAIN AND LOWER IN FATTY FOODS.      Think about those things which may be affecting your ability to reach those goals, and develop a plan to overcome them.     Additional resources are available upon request to help you attain goals, including dietitian.     Sasha Read RN

## 2023-11-21 ENCOUNTER — PATIENT OUTREACH (OUTPATIENT)
Dept: PRIMARY CARE | Facility: CLINIC | Age: 78
End: 2023-11-21
Payer: MEDICARE

## 2023-11-21 DIAGNOSIS — E78.5 HYPERLIPIDEMIA, UNSPECIFIED HYPERLIPIDEMIA TYPE: ICD-10-CM

## 2023-11-21 DIAGNOSIS — E11.9 TYPE 2 DIABETES MELLITUS WITHOUT COMPLICATION, UNSPECIFIED WHETHER LONG TERM INSULIN USE (MULTI): ICD-10-CM

## 2023-11-21 DIAGNOSIS — I15.9 SECONDARY HYPERTENSION: ICD-10-CM

## 2023-11-21 DIAGNOSIS — I10 ESSENTIAL HYPERTENSION, BENIGN: ICD-10-CM

## 2023-11-21 RX ORDER — METOPROLOL TARTRATE 25 MG/1
25 TABLET, FILM COATED ORAL 2 TIMES DAILY
Qty: 180 TABLET | Refills: 2 | Status: SHIPPED | OUTPATIENT
Start: 2023-11-21 | End: 2024-01-30 | Stop reason: SDUPTHER

## 2023-12-21 ENCOUNTER — APPOINTMENT (OUTPATIENT)
Dept: PRIMARY CARE | Facility: CLINIC | Age: 78
End: 2023-12-21
Payer: MEDICARE

## 2023-12-22 ENCOUNTER — PATIENT OUTREACH (OUTPATIENT)
Dept: PRIMARY CARE | Facility: CLINIC | Age: 78
End: 2023-12-22
Payer: MEDICARE

## 2023-12-22 DIAGNOSIS — E78.5 HYPERLIPIDEMIA, UNSPECIFIED HYPERLIPIDEMIA TYPE: ICD-10-CM

## 2023-12-22 DIAGNOSIS — E11.9 TYPE 2 DIABETES MELLITUS WITHOUT COMPLICATION, UNSPECIFIED WHETHER LONG TERM INSULIN USE (MULTI): ICD-10-CM

## 2023-12-22 DIAGNOSIS — I10 ESSENTIAL HYPERTENSION, BENIGN: ICD-10-CM

## 2023-12-22 NOTE — PROGRESS NOTES
"Will send letter and close [facility] program due to several attempts made to reach Ms. Oropeza with no contact.    LOV: 23, /85, HR 78, A1C 5.5%  NOV: 24 MCR AWV     Attempt made to reach Ms. Oropeza at her listed contact number for [facility] outreach.  Received \"busy signal\" x2 attempts, will continue to follow-up for [facility] outreach at a later time.    Treatment Goals     For high blood pressure:   Treatment goals include:  [place]/continue prudent diet and regular exercise.   Blood Pressure Treatment goals include:   BP of 120/80, with no higher than 140/85 on consistent basis.   Exercise at least 3-4 days a week for at least 30 minutes  Eat a balanced diet, rich in fruits and vegetables, low in sodium and processed foods.  If you are overweight, work toward a goal BMI of less than 25, with short-term goal of 10 pound weight loss.     For diabetes:   Treatment goals include:  HgbA1C less than 7.0%  Fasting B - 130 mg/dL  Postprandial BG: less than 180 mg/dL  /80 on consistent basis, with no higher than 140/85  LDL cholesterol less than 100, and HDL cholesterol above 40.  Yearly retinal exam  Check feet periodically for sores or decreased sensation  Exercise at least 3-4 days a week for at least 30 minutes  Work toward a goal BMI of less than 25, although in the shorter term, losing even 10 pounds will help.     For Cholesterol:   Treatment goals include:  HIGHER IN FRUITS AND VEGGIES AND WHOLE GRAIN AND LOWER IN FATTY FOODS.      Think about those things which may be affecting your ability to reach those goals, and develop a plan to overcome them.     Additional resources are available upon request to help you attain goals, including dietitian.     Sasha Read RN    "

## 2024-01-08 ENCOUNTER — DOCUMENTATION (OUTPATIENT)
Dept: HOME HEALTH SERVICES | Facility: HOME HEALTH | Age: 79
End: 2024-01-08

## 2024-01-08 ENCOUNTER — HOME HEALTH ADMISSION (OUTPATIENT)
Dept: HOME HEALTH SERVICES | Facility: HOME HEALTH | Age: 79
End: 2024-01-08
Payer: MEDICARE

## 2024-01-08 ENCOUNTER — OFFICE VISIT (OUTPATIENT)
Dept: PRIMARY CARE | Facility: CLINIC | Age: 79
End: 2024-01-08
Payer: MEDICARE

## 2024-01-08 VITALS
BODY MASS INDEX: 25.55 KG/M2 | HEIGHT: 66 IN | SYSTOLIC BLOOD PRESSURE: 119 MMHG | OXYGEN SATURATION: 95 % | HEART RATE: 74 BPM | DIASTOLIC BLOOD PRESSURE: 70 MMHG | WEIGHT: 159 LBS

## 2024-01-08 DIAGNOSIS — E11.9 TYPE 2 DIABETES MELLITUS WITHOUT COMPLICATION, UNSPECIFIED WHETHER LONG TERM INSULIN USE (MULTI): ICD-10-CM

## 2024-01-08 DIAGNOSIS — G30.1 LATE ONSET ALZHEIMER'S DEMENTIA WITHOUT BEHAVIORAL DISTURBANCE (MULTI): ICD-10-CM

## 2024-01-08 DIAGNOSIS — E44.0 MALNUTRITION OF MODERATE DEGREE (MULTI): ICD-10-CM

## 2024-01-08 DIAGNOSIS — F33.9 DEPRESSION, RECURRENT (CMS-HCC): ICD-10-CM

## 2024-01-08 DIAGNOSIS — D64.9 ANEMIA, UNSPECIFIED TYPE: ICD-10-CM

## 2024-01-08 DIAGNOSIS — J84.9 INTERSTITIAL PULMONARY DISEASE, UNSPECIFIED (MULTI): ICD-10-CM

## 2024-01-08 DIAGNOSIS — E11.42 DIABETIC PERIPHERAL NEUROPATHY (MULTI): ICD-10-CM

## 2024-01-08 DIAGNOSIS — F02.80 LATE ONSET ALZHEIMER'S DEMENTIA WITHOUT BEHAVIORAL DISTURBANCE (MULTI): ICD-10-CM

## 2024-01-08 DIAGNOSIS — F41.9 ANXIETY: ICD-10-CM

## 2024-01-08 DIAGNOSIS — Z99.89 DEPENDENT ON WALKER FOR AMBULATION: ICD-10-CM

## 2024-01-08 DIAGNOSIS — E78.5 HYPERLIPIDEMIA, UNSPECIFIED HYPERLIPIDEMIA TYPE: ICD-10-CM

## 2024-01-08 DIAGNOSIS — E78.00 HYPERCHOLESTEROLEMIA: ICD-10-CM

## 2024-01-08 DIAGNOSIS — M06.9 RHEUMATOID ARTHRITIS, INVOLVING UNSPECIFIED SITE, UNSPECIFIED WHETHER RHEUMATOID FACTOR PRESENT (MULTI): Primary | ICD-10-CM

## 2024-01-08 DIAGNOSIS — J45.902 ASTHMA WITH STATUS ASTHMATICUS, UNSPECIFIED ASTHMA SEVERITY, UNSPECIFIED WHETHER PERSISTENT (HHS-HCC): ICD-10-CM

## 2024-01-08 DIAGNOSIS — I10 ESSENTIAL HYPERTENSION, BENIGN: ICD-10-CM

## 2024-01-08 PROBLEM — R48.8 OTHER SYMBOLIC DYSFUNCTIONS: Status: ACTIVE | Noted: 2022-08-09

## 2024-01-08 PROBLEM — N39.0 UTI (URINARY TRACT INFECTION): Status: ACTIVE | Noted: 2023-04-14

## 2024-01-08 PROBLEM — R41.82 CHANGE IN MENTAL STATUS: Status: ACTIVE | Noted: 2023-05-25

## 2024-01-08 PROBLEM — E46 UNSPECIFIED PROTEIN-CALORIE MALNUTRITION (MULTI): Status: ACTIVE | Noted: 2022-08-09

## 2024-01-08 PROBLEM — N75.0 CYST OF BARTHOLIN'S GLAND: Status: ACTIVE | Noted: 2022-08-09

## 2024-01-08 PROBLEM — M85.80 OSTEOPENIA: Status: ACTIVE | Noted: 2024-01-08

## 2024-01-08 PROCEDURE — 1159F MED LIST DOCD IN RCRD: CPT | Performed by: STUDENT IN AN ORGANIZED HEALTH CARE EDUCATION/TRAINING PROGRAM

## 2024-01-08 PROCEDURE — 1160F RVW MEDS BY RX/DR IN RCRD: CPT | Performed by: STUDENT IN AN ORGANIZED HEALTH CARE EDUCATION/TRAINING PROGRAM

## 2024-01-08 PROCEDURE — 1126F AMNT PAIN NOTED NONE PRSNT: CPT | Performed by: STUDENT IN AN ORGANIZED HEALTH CARE EDUCATION/TRAINING PROGRAM

## 2024-01-08 PROCEDURE — 3074F SYST BP LT 130 MM HG: CPT | Performed by: STUDENT IN AN ORGANIZED HEALTH CARE EDUCATION/TRAINING PROGRAM

## 2024-01-08 PROCEDURE — 1036F TOBACCO NON-USER: CPT | Performed by: STUDENT IN AN ORGANIZED HEALTH CARE EDUCATION/TRAINING PROGRAM

## 2024-01-08 PROCEDURE — 99214 OFFICE O/P EST MOD 30 MIN: CPT | Performed by: STUDENT IN AN ORGANIZED HEALTH CARE EDUCATION/TRAINING PROGRAM

## 2024-01-08 PROCEDURE — 1170F FXNL STATUS ASSESSED: CPT | Performed by: STUDENT IN AN ORGANIZED HEALTH CARE EDUCATION/TRAINING PROGRAM

## 2024-01-08 PROCEDURE — 3078F DIAST BP <80 MM HG: CPT | Performed by: STUDENT IN AN ORGANIZED HEALTH CARE EDUCATION/TRAINING PROGRAM

## 2024-01-08 RX ORDER — OXYBUTYNIN CHLORIDE 5 MG/1
5 TABLET, EXTENDED RELEASE ORAL DAILY
Qty: 90 TABLET | Refills: 2 | Status: SHIPPED | OUTPATIENT
Start: 2024-01-08

## 2024-01-08 RX ORDER — AMLODIPINE BESYLATE 10 MG/1
10 TABLET ORAL DAILY
Qty: 90 TABLET | Refills: 2 | Status: SHIPPED | OUTPATIENT
Start: 2024-01-08

## 2024-01-08 RX ORDER — ATORVASTATIN CALCIUM 40 MG/1
40 TABLET, FILM COATED ORAL EVERY EVENING
Qty: 90 TABLET | Refills: 2 | Status: SHIPPED | OUTPATIENT
Start: 2024-01-08

## 2024-01-08 RX ORDER — METFORMIN HYDROCHLORIDE 500 MG/1
500 TABLET ORAL DAILY
Qty: 90 TABLET | Refills: 1 | Status: SHIPPED | OUTPATIENT
Start: 2024-01-08

## 2024-01-08 RX ORDER — FERROUS SULFATE 325(65) MG
1 TABLET ORAL DAILY
COMMUNITY
Start: 2023-02-18 | End: 2024-04-29 | Stop reason: SDUPTHER

## 2024-01-08 RX ORDER — ALBUTEROL SULFATE 90 UG/1
2 AEROSOL, METERED RESPIRATORY (INHALATION) 4 TIMES DAILY PRN
Qty: 18 G | Refills: 2 | Status: SHIPPED | OUTPATIENT
Start: 2024-01-08

## 2024-01-08 RX ORDER — HYDROXYCHLOROQUINE SULFATE 200 MG/1
200 TABLET, FILM COATED ORAL
Qty: 60 TABLET | Refills: 1 | Status: SHIPPED | OUTPATIENT
Start: 2024-01-08

## 2024-01-08 RX ORDER — SERTRALINE HYDROCHLORIDE 100 MG/1
100 TABLET, FILM COATED ORAL DAILY
Qty: 90 TABLET | Refills: 1 | Status: SHIPPED | OUTPATIENT
Start: 2024-01-08 | End: 2024-03-11 | Stop reason: ALTCHOICE

## 2024-01-08 ASSESSMENT — ACTIVITIES OF DAILY LIVING (ADL)
DOING_HOUSEWORK: NEEDS ASSISTANCE
BATHING: INDEPENDENT
DRESSING: INDEPENDENT
MANAGING_FINANCES: NEEDS ASSISTANCE
GROCERY_SHOPPING: NEEDS ASSISTANCE
TAKING_MEDICATION: NEEDS ASSISTANCE

## 2024-01-08 ASSESSMENT — PATIENT HEALTH QUESTIONNAIRE - PHQ9
1. LITTLE INTEREST OR PLEASURE IN DOING THINGS: NOT AT ALL
SUM OF ALL RESPONSES TO PHQ9 QUESTIONS 1 AND 2: 0
2. FEELING DOWN, DEPRESSED OR HOPELESS: NOT AT ALL

## 2024-01-08 ASSESSMENT — ENCOUNTER SYMPTOMS
OCCASIONAL FEELINGS OF UNSTEADINESS: 1
DEPRESSION: 0
LOSS OF SENSATION IN FEET: 0

## 2024-01-08 NOTE — PROGRESS NOTES
78-year-old female presenting for multiple concerns:    OA, RA with Felty  Following with rheumatology, worsening.  Having significant difficulties around the house with ADLs.  Requesting home health aide     Falls  No longer doing PT/OT.  Discussed in depth last year about assisted living, worked with patient navigator and chronic care nurse, patient ultimately opted out.     DMII  Stable     Anemia  Stable     CKD  Stable     HTN  Stable     HLD  Stable     Depression  Still struggling with depression, has had a lot going on.  Has not seen counselor/therapist.  States she is willing to see psychiatrist.    12 point ROS reviewed and negative other than as stated in HPI    General: Alert, oriented, pleasant, in mild acute distress  HEENT:      Head: normocephalic, atraumatic;      eyes: EOMI, no scleral icterus;   CV: Heart with regular rate and rhythm, normal S1/S2, no murmurs  Lungs: CTAB without wheezing, rhonchi or rales; good respiratory effort, no increased work of breathing  Extremities: Significant RA deformities of the hands bilaterally  Neuro: Cranial nerves grossly intact; alert and oriented, rollator dependent  Psych: Crying intermittently throughout appointment    #OA #RA with Felty #falls  -Following with rheum, continue medication regimen per specialist  -Rollator dependent  -Home health aide, home PT/OT  - Continue to advise assisted living, patient currently declines, social work referral     #DMII  -A1c     #Anemia  -Repeat CBC  #CKD  -Repeat BMP     #HTN  -At goal  -Continue current regimen     #HLD  -Continue current regimen     #Depression  -Continue current regimen  -Psychiatry referral    Follow-up 2-3 months, Medicare due at that time    Christopher D'Amico, DO

## 2024-01-08 NOTE — HH CARE COORDINATION
Home Care received a Referral for Physical Therapy, Occupational Therapy, and Home Health Aide. We have processed the referral for a Start of Care on 24-48 hr.     If you have any questions or concerns, please feel free to contact us at [phone]. Follow the prompts, enter your five digit zip code, and you will be directed to your care team on CENT[facility] 1.

## 2024-01-09 ENCOUNTER — LAB (OUTPATIENT)
Dept: LAB | Facility: LAB | Age: 79
End: 2024-01-09
Payer: MEDICARE

## 2024-01-09 ENCOUNTER — HOME CARE VISIT (OUTPATIENT)
Dept: HOME HEALTH SERVICES | Facility: HOME HEALTH | Age: 79
End: 2024-01-09
Payer: MEDICARE

## 2024-01-09 VITALS — HEART RATE: 84 BPM | DIASTOLIC BLOOD PRESSURE: 62 MMHG | SYSTOLIC BLOOD PRESSURE: 110 MMHG

## 2024-01-09 DIAGNOSIS — F02.80 LATE ONSET ALZHEIMER'S DEMENTIA WITHOUT BEHAVIORAL DISTURBANCE (MULTI): ICD-10-CM

## 2024-01-09 DIAGNOSIS — E44.0 MALNUTRITION OF MODERATE DEGREE (MULTI): ICD-10-CM

## 2024-01-09 DIAGNOSIS — G30.1 LATE ONSET ALZHEIMER'S DEMENTIA WITHOUT BEHAVIORAL DISTURBANCE (MULTI): ICD-10-CM

## 2024-01-09 DIAGNOSIS — J84.9 INTERSTITIAL PULMONARY DISEASE, UNSPECIFIED (MULTI): ICD-10-CM

## 2024-01-09 DIAGNOSIS — Z99.89 DEPENDENT ON WALKER FOR AMBULATION: ICD-10-CM

## 2024-01-09 DIAGNOSIS — M06.9 RHEUMATOID ARTHRITIS, INVOLVING UNSPECIFIED SITE, UNSPECIFIED WHETHER RHEUMATOID FACTOR PRESENT (MULTI): ICD-10-CM

## 2024-01-09 DIAGNOSIS — F33.9 DEPRESSION, RECURRENT (CMS-HCC): ICD-10-CM

## 2024-01-09 DIAGNOSIS — E11.42 DIABETIC PERIPHERAL NEUROPATHY (MULTI): ICD-10-CM

## 2024-01-09 DIAGNOSIS — E51.9 THIAMINE DEFICIENCY: ICD-10-CM

## 2024-01-09 LAB
ALBUMIN SERPL BCP-MCNC: 4.1 G/DL (ref 3.4–5)
ALP SERPL-CCNC: 99 U/L (ref 33–136)
ALT SERPL W P-5'-P-CCNC: 11 U/L (ref 7–45)
ANION GAP SERPL CALC-SCNC: 13 MMOL/L (ref 10–20)
AST SERPL W P-5'-P-CCNC: 15 U/L (ref 9–39)
BILIRUB SERPL-MCNC: 0.4 MG/DL (ref 0–1.2)
BUN SERPL-MCNC: 16 MG/DL (ref 6–23)
CALCIUM SERPL-MCNC: 9.6 MG/DL (ref 8.6–10.6)
CHLORIDE SERPL-SCNC: 107 MMOL/L (ref 98–107)
CHOLEST SERPL-MCNC: 147 MG/DL (ref 0–199)
CHOLESTEROL/HDL RATIO: 2.6
CO2 SERPL-SCNC: 29 MMOL/L (ref 21–32)
CREAT SERPL-MCNC: 1.25 MG/DL (ref 0.5–1.05)
CREAT UR-MCNC: 98.9 MG/DL (ref 20–320)
EGFRCR SERPLBLD CKD-EPI 2021: 44 ML/MIN/1.73M*2
ERYTHROCYTE [DISTWIDTH] IN BLOOD BY AUTOMATED COUNT: 16 % (ref 11.5–14.5)
EST. AVERAGE GLUCOSE BLD GHB EST-MCNC: 131 MG/DL
GLUCOSE SERPL-MCNC: 103 MG/DL (ref 74–99)
HBA1C MFR BLD: 6.2 %
HCT VFR BLD AUTO: 33.1 % (ref 36–46)
HDLC SERPL-MCNC: 56.2 MG/DL
HGB BLD-MCNC: 10.3 G/DL (ref 12–16)
LDLC SERPL CALC-MCNC: 73 MG/DL
MCH RBC QN AUTO: 25.2 PG (ref 26–34)
MCHC RBC AUTO-ENTMCNC: 31.1 G/DL (ref 32–36)
MCV RBC AUTO: 81 FL (ref 80–100)
MICROALBUMIN UR-MCNC: 7.2 MG/L
MICROALBUMIN/CREAT UR: 7.3 UG/MG CREAT
NON HDL CHOLESTEROL: 91 MG/DL (ref 0–149)
NRBC BLD-RTO: 0 /100 WBCS (ref 0–0)
PLATELET # BLD AUTO: 227 X10*3/UL (ref 150–450)
POTASSIUM SERPL-SCNC: 4.6 MMOL/L (ref 3.5–5.3)
PROT SERPL-MCNC: 7.6 G/DL (ref 6.4–8.2)
RBC # BLD AUTO: 4.08 X10*6/UL (ref 4–5.2)
SODIUM SERPL-SCNC: 144 MMOL/L (ref 136–145)
TRIGL SERPL-MCNC: 87 MG/DL (ref 0–149)
VLDL: 17 MG/DL (ref 0–40)
WBC # BLD AUTO: 4.2 X10*3/UL (ref 4.4–11.3)

## 2024-01-09 PROCEDURE — 82043 UR ALBUMIN QUANTITATIVE: CPT

## 2024-01-09 PROCEDURE — 80061 LIPID PANEL: CPT

## 2024-01-09 PROCEDURE — 1090000001 HH PPS REVENUE CREDIT

## 2024-01-09 PROCEDURE — 1090000002 HH PPS REVENUE DEBIT

## 2024-01-09 PROCEDURE — 84425 ASSAY OF VITAMIN B-1: CPT

## 2024-01-09 PROCEDURE — G0151 HHCP-SERV OF PT,EA 15 MIN: HCPCS | Mod: HHH

## 2024-01-09 PROCEDURE — 169592 NO-PAY CLAIM PROCEDURE

## 2024-01-09 PROCEDURE — 85027 COMPLETE CBC AUTOMATED: CPT

## 2024-01-09 PROCEDURE — 0023 HH SOC

## 2024-01-09 PROCEDURE — 82570 ASSAY OF URINE CREATININE: CPT

## 2024-01-09 PROCEDURE — 80053 COMPREHEN METABOLIC PANEL: CPT

## 2024-01-09 PROCEDURE — 83036 HEMOGLOBIN GLYCOSYLATED A1C: CPT

## 2024-01-09 PROCEDURE — 36415 COLL VENOUS BLD VENIPUNCTURE: CPT

## 2024-01-09 ASSESSMENT — ACTIVITIES OF DAILY LIVING (ADL)
AMBULATION ASSISTANCE ON FLAT SURFACES: 1
AMBULATION_DISTANCE/DURATION_TOLERATED: 75 FT
AMBULATION ASSISTANCE: STAND BY ASSIST
AMBULATION ASSISTANCE: 1
ENTERING_EXITING_HOME: NEEDS ASSISTANCE

## 2024-01-09 ASSESSMENT — BALANCE ASSESSMENTS
ATTEMPTS TO ARISE: 1 - ABLE, REQUIRES MORE THAN ONE ATTEMPT
BALANCE SCORE: 6
ARISES: 1 - ABLE, USES ARMS TO HELP
ARISING SCORE: 1
NUDGED SCORE: 0
EYES CLOSED AT MAXIMUM POSITION NUDGED: 0 - UNSTEADY
SITTING DOWN: 1 - USES ARMS OR NOT SMOOTH MOTION
STANDING BALANCE: 1 - STEADY BUT WIDE STANCE AND USES CANE OR OTHER SUPPORT
SITTING BALANCE: 1 - STEADY, SAFE
IMMEDIATE STANDING BALANCE FIRST 5 SECONDS: 1 - STEADY BUT USES WALKER OR OTHER SUPPORT
TURNING 360 DEGREES STEPS: 0 - DISCONTINUOUS STEPS
NUDGED: 0 - BEGINS TO FALL

## 2024-01-09 ASSESSMENT — GAIT ASSESSMENTS
INITIATION OF GAIT IMMEDIATELY AFTER GO: 1 - NO HESITANCY
GAIT SCORE: 9
BALANCE AND GAIT SCORE: 15
PATH: 1 - MILD/MODERATE DEVIATION OR USES WALKING AID
WALKING STANCE: 1 - HEELS ALMOST TOUCHING WHILE WALKING
TRUNK SCORE: 0
PATH SCORE: 1
STEP CONTINUITY: 1 - STEPS APPEAR CONTINUOUS
STEP SYMMETRY: 1 - RIGHT AND LEFT STEP LENGTH APPEAR EQUAL
TRUNK: 0 - MARKED SWAY OR USES WALKING AID

## 2024-01-09 ASSESSMENT — ENCOUNTER SYMPTOMS: DENIES PAIN: 1

## 2024-01-09 NOTE — LETTER
January 17, 2024     Toyin Oropeza  [address]  [facility] [address]      Dear Ms. Oropeza:    Below are the results from your recent visit:    Vitamin B1 is below normal limits, recommend taking OTC vitamin B 1 (thiamine)     Chronic kidney disease stable, improved over the past year.  Recommend continuing to avoid nephrotoxic medications, and stay adequately hydrated.     Hemoglobin A1c at 6.2, at diabetic goal, continue metformin only.  Will need to make adjustments on metformin if kidney function continues to decrease.     Moderate anemia, has improved over the past year.  Will continue to monitor     Remaining labs essentially unremarkable.           If you have any questions or concerns, please don't hesitate to call.

## 2024-01-10 ENCOUNTER — HOME CARE VISIT (OUTPATIENT)
Dept: HOME HEALTH SERVICES | Facility: HOME HEALTH | Age: 79
End: 2024-01-10
Payer: MEDICARE

## 2024-01-10 PROCEDURE — 1090000002 HH PPS REVENUE DEBIT

## 2024-01-10 PROCEDURE — 1090000001 HH PPS REVENUE CREDIT

## 2024-01-10 PROCEDURE — G0152 HHCP-SERV OF OT,EA 15 MIN: HCPCS | Mod: HHH

## 2024-01-10 ASSESSMENT — ENCOUNTER SYMPTOMS
SUBJECTIVE PAIN PROGRESSION: WAXING AND WANING
PAIN LOCATION: RIGHT LEG
PAIN: 1
PERSON REPORTING PAIN: PATIENT

## 2024-01-10 ASSESSMENT — ACTIVITIES OF DAILY LIVING (ADL)
DRESSING_UB_CURRENT_FUNCTION: INDEPENDENT
BATHING ASSESSED: 1
DRESSING_LB_CURRENT_FUNCTION: INDEPENDENT
BATHING_CURRENT_FUNCTION: INDEPENDENT
BATHING EQUIPMENT USED: SPONGE BATHING

## 2024-01-11 ENCOUNTER — HOME CARE VISIT (OUTPATIENT)
Dept: HOME HEALTH SERVICES | Facility: HOME HEALTH | Age: 79
End: 2024-01-11
Payer: MEDICARE

## 2024-01-11 VITALS
HEIGHT: 66 IN | RESPIRATION RATE: 18 BRPM | HEART RATE: 61 BPM | DIASTOLIC BLOOD PRESSURE: 80 MMHG | TEMPERATURE: 98.3 F | WEIGHT: 161 LBS | BODY MASS INDEX: 25.88 KG/M2 | SYSTOLIC BLOOD PRESSURE: 140 MMHG | OXYGEN SATURATION: 98 %

## 2024-01-11 PROCEDURE — 1090000001 HH PPS REVENUE CREDIT

## 2024-01-11 PROCEDURE — 1090000002 HH PPS REVENUE DEBIT

## 2024-01-11 PROCEDURE — G0299 HHS/HOSPICE OF RN EA 15 MIN: HCPCS | Mod: HHH

## 2024-01-11 ASSESSMENT — PAIN SCALES - PAIN ASSESSMENT IN ADVANCED DEMENTIA (PAINAD)
BODYLANGUAGE: 0
CONSOLABILITY: 0 - NO NEED TO CONSOLE.
BODYLANGUAGE: 0 - RELAXED.
NEGVOCALIZATION: 0 - NONE.
FACIALEXPRESSION: 0
BREATHING: 0
NEGVOCALIZATION: 0
CONSOLABILITY: 0
TOTALSCORE: 0
FACIALEXPRESSION: 0 - SMILING OR INEXPRESSIVE.

## 2024-01-11 ASSESSMENT — ENCOUNTER SYMPTOMS
LOSS OF SENSATION IN FEET: 0
CHANGE IN APPETITE: VARYING
DEPRESSION: 1
DENIES PAIN: 1
APPETITE LEVEL: FAIR
OCCASIONAL FEELINGS OF UNSTEADINESS: 1

## 2024-01-12 ENCOUNTER — HOME CARE VISIT (OUTPATIENT)
Dept: HOME HEALTH SERVICES | Facility: HOME HEALTH | Age: 79
End: 2024-01-12
Payer: MEDICARE

## 2024-01-12 VITALS — HEART RATE: 66 BPM | RESPIRATION RATE: 18 BRPM | SYSTOLIC BLOOD PRESSURE: 126 MMHG | DIASTOLIC BLOOD PRESSURE: 76 MMHG

## 2024-01-12 LAB — VIT B1 PYROPHOSHATE BLD-SCNC: 53 NMOL/L (ref 70–180)

## 2024-01-12 PROCEDURE — 1090000002 HH PPS REVENUE DEBIT

## 2024-01-12 PROCEDURE — G0157 HHC PT ASSISTANT EA 15: HCPCS | Mod: HHH

## 2024-01-12 PROCEDURE — 1090000001 HH PPS REVENUE CREDIT

## 2024-01-13 PROCEDURE — 1090000001 HH PPS REVENUE CREDIT

## 2024-01-13 PROCEDURE — 1090000002 HH PPS REVENUE DEBIT

## 2024-01-14 PROCEDURE — 1090000001 HH PPS REVENUE CREDIT

## 2024-01-14 PROCEDURE — 1090000002 HH PPS REVENUE DEBIT

## 2024-01-15 ENCOUNTER — HOME CARE VISIT (OUTPATIENT)
Dept: HOME HEALTH SERVICES | Facility: HOME HEALTH | Age: 79
End: 2024-01-15
Payer: MEDICARE

## 2024-01-15 VITALS — SYSTOLIC BLOOD PRESSURE: 132 MMHG | RESPIRATION RATE: 18 BRPM | HEART RATE: 74 BPM | DIASTOLIC BLOOD PRESSURE: 78 MMHG

## 2024-01-15 PROCEDURE — 1090000002 HH PPS REVENUE DEBIT

## 2024-01-15 PROCEDURE — G0157 HHC PT ASSISTANT EA 15: HCPCS | Mod: HHH

## 2024-01-15 PROCEDURE — 1090000001 HH PPS REVENUE CREDIT

## 2024-01-15 NOTE — RESULT ENCOUNTER NOTE
Vitamin B1 is below normal limits, recommend taking OTC vitamin B 1 (thiamine)    Chronic kidney disease stable, improved over the past year.  Recommend continuing to avoid nephrotoxic medications, and stay adequately hydrated.    Hemoglobin A1c at 6.2, at diabetic goal, continue metformin only.  Will need to make adjustments on metformin if kidney function continues to decrease.    Moderate anemia, has improved over the past year.  Will continue to monitor    Remaining labs essentially unremarkable.

## 2024-01-16 PROCEDURE — 1090000001 HH PPS REVENUE CREDIT

## 2024-01-16 PROCEDURE — 1090000002 HH PPS REVENUE DEBIT

## 2024-01-17 ENCOUNTER — HOME CARE VISIT (OUTPATIENT)
Dept: HOME HEALTH SERVICES | Facility: HOME HEALTH | Age: 79
End: 2024-01-17
Payer: MEDICARE

## 2024-01-17 VITALS — HEART RATE: 78 BPM | DIASTOLIC BLOOD PRESSURE: 78 MMHG | RESPIRATION RATE: 18 BRPM | SYSTOLIC BLOOD PRESSURE: 126 MMHG

## 2024-01-17 PROCEDURE — 1090000002 HH PPS REVENUE DEBIT

## 2024-01-17 PROCEDURE — 1090000001 HH PPS REVENUE CREDIT

## 2024-01-17 PROCEDURE — G0157 HHC PT ASSISTANT EA 15: HCPCS | Mod: HHH

## 2024-01-18 ENCOUNTER — HOME CARE VISIT (OUTPATIENT)
Dept: HOME HEALTH SERVICES | Facility: HOME HEALTH | Age: 79
End: 2024-01-18
Payer: MEDICARE

## 2024-01-18 PROCEDURE — G0152 HHCP-SERV OF OT,EA 15 MIN: HCPCS | Mod: HHH

## 2024-01-18 PROCEDURE — 1090000001 HH PPS REVENUE CREDIT

## 2024-01-18 PROCEDURE — G0155 HHCP-SVS OF CSW,EA 15 MIN: HCPCS | Mod: HHH

## 2024-01-18 PROCEDURE — 1090000002 HH PPS REVENUE DEBIT

## 2024-01-18 SDOH — HEALTH STABILITY: MENTAL HEALTH: STRESS FACTORS COMMENTS: INCREASED MEDICAL ISSUES

## 2024-01-18 SDOH — SOCIAL STABILITY: SOCIAL NETWORK: HELP FROM FAMILY/FRIENDS: BROTHER

## 2024-01-18 ASSESSMENT — ACTIVITIES OF DAILY LIVING (ADL)
BATHING_REQUIRES_ASSISTANCE: 1
AMBULATION_REQUIRES_ASSISTANCE: 1
SHOPPING_REQUIRES_ASSISTANCE: 1
LAUNDRY_REQUIRES_ASSISTANCE: 1

## 2024-01-18 ASSESSMENT — ENCOUNTER SYMPTOMS: AGITATION: 1

## 2024-01-19 ENCOUNTER — HOME CARE VISIT (OUTPATIENT)
Dept: HOME HEALTH SERVICES | Facility: HOME HEALTH | Age: 79
End: 2024-01-19
Payer: MEDICARE

## 2024-01-19 PROCEDURE — 1090000002 HH PPS REVENUE DEBIT

## 2024-01-19 PROCEDURE — 1090000001 HH PPS REVENUE CREDIT

## 2024-01-19 ASSESSMENT — ACTIVITIES OF DAILY LIVING (ADL): OASIS_M1830: 05

## 2024-01-20 PROCEDURE — 1090000001 HH PPS REVENUE CREDIT

## 2024-01-20 PROCEDURE — 1090000002 HH PPS REVENUE DEBIT

## 2024-01-21 PROCEDURE — 1090000002 HH PPS REVENUE DEBIT

## 2024-01-21 PROCEDURE — 1090000001 HH PPS REVENUE CREDIT

## 2024-01-22 ENCOUNTER — HOME CARE VISIT (OUTPATIENT)
Dept: HOME HEALTH SERVICES | Facility: HOME HEALTH | Age: 79
End: 2024-01-22
Payer: MEDICARE

## 2024-01-22 VITALS
HEART RATE: 75 BPM | DIASTOLIC BLOOD PRESSURE: 62 MMHG | OXYGEN SATURATION: 96 % | RESPIRATION RATE: 18 BRPM | SYSTOLIC BLOOD PRESSURE: 118 MMHG | TEMPERATURE: 97.3 F

## 2024-01-22 VITALS — HEART RATE: 74 BPM | SYSTOLIC BLOOD PRESSURE: 130 MMHG | RESPIRATION RATE: 18 BRPM | DIASTOLIC BLOOD PRESSURE: 80 MMHG

## 2024-01-22 PROCEDURE — G0157 HHC PT ASSISTANT EA 15: HCPCS | Mod: HHH

## 2024-01-22 PROCEDURE — 1090000002 HH PPS REVENUE DEBIT

## 2024-01-22 PROCEDURE — G0299 HHS/HOSPICE OF RN EA 15 MIN: HCPCS | Mod: HHH

## 2024-01-22 PROCEDURE — 1090000001 HH PPS REVENUE CREDIT

## 2024-01-22 ASSESSMENT — ENCOUNTER SYMPTOMS
LOSS OF SENSATION IN FEET: 1
APPETITE LEVEL: FAIR
DEPRESSION: 1
OCCASIONAL FEELINGS OF UNSTEADINESS: 1
DENIES PAIN: 1

## 2024-01-22 ASSESSMENT — PAIN SCALES - PAIN ASSESSMENT IN ADVANCED DEMENTIA (PAINAD)
BODYLANGUAGE: 0
BREATHING: 0
FACIALEXPRESSION: 0 - SMILING OR INEXPRESSIVE.
NEGVOCALIZATION: 0 - NONE.
TOTALSCORE: 0
CONSOLABILITY: 0
BODYLANGUAGE: 0 - RELAXED.
CONSOLABILITY: 0 - NO NEED TO CONSOLE.
FACIALEXPRESSION: 0
NEGVOCALIZATION: 0

## 2024-01-23 PROCEDURE — 1090000002 HH PPS REVENUE DEBIT

## 2024-01-23 PROCEDURE — 1090000001 HH PPS REVENUE CREDIT

## 2024-01-24 ENCOUNTER — HOME CARE VISIT (OUTPATIENT)
Dept: HOME HEALTH SERVICES | Facility: HOME HEALTH | Age: 79
End: 2024-01-24
Payer: MEDICARE

## 2024-01-24 PROCEDURE — 1090000002 HH PPS REVENUE DEBIT

## 2024-01-24 PROCEDURE — G0157 HHC PT ASSISTANT EA 15: HCPCS | Mod: HHH

## 2024-01-24 PROCEDURE — 1090000001 HH PPS REVENUE CREDIT

## 2024-01-24 PROCEDURE — G0156 HHCP-SVS OF AIDE,EA 15 MIN: HCPCS | Mod: HHH

## 2024-01-25 ENCOUNTER — HOME CARE VISIT (OUTPATIENT)
Dept: HOME HEALTH SERVICES | Facility: HOME HEALTH | Age: 79
End: 2024-01-25
Payer: MEDICARE

## 2024-01-25 PROCEDURE — 1090000002 HH PPS REVENUE DEBIT

## 2024-01-25 PROCEDURE — 1090000001 HH PPS REVENUE CREDIT

## 2024-01-26 PROCEDURE — 1090000002 HH PPS REVENUE DEBIT

## 2024-01-26 PROCEDURE — 1090000001 HH PPS REVENUE CREDIT

## 2024-01-27 PROCEDURE — 1090000001 HH PPS REVENUE CREDIT

## 2024-01-27 PROCEDURE — 1090000002 HH PPS REVENUE DEBIT

## 2024-01-28 PROCEDURE — 1090000002 HH PPS REVENUE DEBIT

## 2024-01-28 PROCEDURE — 1090000001 HH PPS REVENUE CREDIT

## 2024-01-29 ENCOUNTER — HOME CARE VISIT (OUTPATIENT)
Dept: HOME HEALTH SERVICES | Facility: HOME HEALTH | Age: 79
End: 2024-01-29
Payer: MEDICARE

## 2024-01-29 VITALS — DIASTOLIC BLOOD PRESSURE: 70 MMHG | HEART RATE: 78 BPM | RESPIRATION RATE: 18 BRPM | SYSTOLIC BLOOD PRESSURE: 122 MMHG

## 2024-01-29 PROCEDURE — G0157 HHC PT ASSISTANT EA 15: HCPCS | Mod: HHH

## 2024-01-29 PROCEDURE — 1090000001 HH PPS REVENUE CREDIT

## 2024-01-29 PROCEDURE — 1090000002 HH PPS REVENUE DEBIT

## 2024-01-30 ENCOUNTER — HOME CARE VISIT (OUTPATIENT)
Dept: HOME HEALTH SERVICES | Facility: HOME HEALTH | Age: 79
End: 2024-01-30
Payer: MEDICARE

## 2024-01-30 VITALS
TEMPERATURE: 98.6 F | SYSTOLIC BLOOD PRESSURE: 114 MMHG | HEART RATE: 67 BPM | DIASTOLIC BLOOD PRESSURE: 68 MMHG | OXYGEN SATURATION: 100 %

## 2024-01-30 DIAGNOSIS — R42 DIZZINESS: ICD-10-CM

## 2024-01-30 DIAGNOSIS — I15.9 SECONDARY HYPERTENSION: ICD-10-CM

## 2024-01-30 PROCEDURE — G0299 HHS/HOSPICE OF RN EA 15 MIN: HCPCS | Mod: HHH

## 2024-01-30 PROCEDURE — G0156 HHCP-SVS OF AIDE,EA 15 MIN: HCPCS | Mod: HHH

## 2024-01-30 PROCEDURE — 1090000002 HH PPS REVENUE DEBIT

## 2024-01-30 PROCEDURE — G0152 HHCP-SERV OF OT,EA 15 MIN: HCPCS | Mod: HHH

## 2024-01-30 PROCEDURE — 1090000001 HH PPS REVENUE CREDIT

## 2024-01-30 RX ORDER — METOPROLOL TARTRATE 25 MG/1
25 TABLET, FILM COATED ORAL 2 TIMES DAILY
Qty: 180 TABLET | Refills: 2 | Status: SHIPPED | OUTPATIENT
Start: 2024-01-30

## 2024-01-30 RX ORDER — MECLIZINE HCL 12.5 MG 12.5 MG/1
12.5 TABLET ORAL 3 TIMES DAILY PRN
Qty: 90 TABLET | Refills: 2 | Status: SHIPPED | OUTPATIENT
Start: 2024-01-30

## 2024-01-30 ASSESSMENT — ENCOUNTER SYMPTOMS
LOWEST PAIN SEVERITY IN PAST 24 HOURS: 0/10
LAST BOWEL MOVEMENT: 66869
PAIN: 1
DIZZINESS: 1
APPETITE LEVEL: GOOD
HIGHEST PAIN SEVERITY IN PAST 24 HOURS: 0/10
PERSON REPORTING PAIN: PATIENT

## 2024-01-31 ENCOUNTER — HOME CARE VISIT (OUTPATIENT)
Dept: HOME HEALTH SERVICES | Facility: HOME HEALTH | Age: 79
End: 2024-01-31
Payer: MEDICARE

## 2024-01-31 VITALS — DIASTOLIC BLOOD PRESSURE: 81 MMHG | HEART RATE: 68 BPM | OXYGEN SATURATION: 98 % | SYSTOLIC BLOOD PRESSURE: 118 MMHG

## 2024-01-31 PROCEDURE — G0151 HHCP-SERV OF PT,EA 15 MIN: HCPCS | Mod: HHH

## 2024-01-31 PROCEDURE — 1090000002 HH PPS REVENUE DEBIT

## 2024-01-31 PROCEDURE — 1090000001 HH PPS REVENUE CREDIT

## 2024-02-01 ENCOUNTER — HOME CARE VISIT (OUTPATIENT)
Dept: HOME HEALTH SERVICES | Facility: HOME HEALTH | Age: 79
End: 2024-02-01
Payer: MEDICARE

## 2024-02-01 PROCEDURE — G0156 HHCP-SVS OF AIDE,EA 15 MIN: HCPCS | Mod: HHH

## 2024-02-01 PROCEDURE — 1090000001 HH PPS REVENUE CREDIT

## 2024-02-01 PROCEDURE — G0155 HHCP-SVS OF CSW,EA 15 MIN: HCPCS | Mod: HHH

## 2024-02-01 PROCEDURE — 1090000002 HH PPS REVENUE DEBIT

## 2024-02-01 SDOH — HEALTH STABILITY: MENTAL HEALTH: SOCIAL ISOLATION: 1

## 2024-02-01 SDOH — ECONOMIC STABILITY: HOUSING INSECURITY: ENVIRONMENTAL RISKS: 1

## 2024-02-01 SDOH — HEALTH STABILITY: MENTAL HEALTH: STRESS FACTORS COMMENTS: REQUESTING MORE ASSISTANCE

## 2024-02-01 ASSESSMENT — ACTIVITIES OF DAILY LIVING (ADL)
BATHING ASSESSED: 1
SHOPPING_REQUIRES_ASSISTANCE: 1
BATHING_CURRENT_FUNCTION: MODERATE ASSIST
AMBULATION_REQUIRES_ASSISTANCE: 1
BATHING_CURRENT_FUNCTION: SUPERVISION
LAUNDRY_REQUIRES_ASSISTANCE: 1
BATHING_CURRENT_FUNCTION: STAND BY ASSIST
BATHING_REQUIRES_ASSISTANCE: 1

## 2024-02-01 ASSESSMENT — ENCOUNTER SYMPTOMS
OCCASIONAL FEELINGS OF UNSTEADINESS: 0
AGITATION: 1
DENIES PAIN: 1

## 2024-02-01 NOTE — CASE COMMUNICATION
MSW follow thru visit withn this pleasantly confused female palso called her brother Seamus . A [employer] referral had been completed approix 1 month ago. Neither pt or Seamus have ackoweldghes receiving any contact from [employer] . A new referral was compketed and they were both encouraged to remain in communication.

## 2024-02-02 PROCEDURE — 1090000002 HH PPS REVENUE DEBIT

## 2024-02-02 PROCEDURE — 1090000001 HH PPS REVENUE CREDIT

## 2024-02-03 PROCEDURE — 1090000001 HH PPS REVENUE CREDIT

## 2024-02-03 PROCEDURE — 1090000002 HH PPS REVENUE DEBIT

## 2024-02-04 PROCEDURE — 1090000002 HH PPS REVENUE DEBIT

## 2024-02-04 PROCEDURE — 1090000001 HH PPS REVENUE CREDIT

## 2024-02-05 ENCOUNTER — HOME CARE VISIT (OUTPATIENT)
Dept: HOME HEALTH SERVICES | Facility: HOME HEALTH | Age: 79
End: 2024-02-05
Payer: MEDICARE

## 2024-02-05 PROCEDURE — 1090000002 HH PPS REVENUE DEBIT

## 2024-02-05 PROCEDURE — 1090000001 HH PPS REVENUE CREDIT

## 2024-02-06 PROCEDURE — 1090000001 HH PPS REVENUE CREDIT

## 2024-02-06 PROCEDURE — 1090000002 HH PPS REVENUE DEBIT

## 2024-02-07 PROCEDURE — 1090000003 HH PPS REVENUE ADJ

## 2024-02-07 PROCEDURE — 1090000002 HH PPS REVENUE DEBIT

## 2024-02-07 PROCEDURE — 1090000001 HH PPS REVENUE CREDIT

## 2024-02-08 ENCOUNTER — HOME CARE VISIT (OUTPATIENT)
Dept: HOME HEALTH SERVICES | Facility: HOME HEALTH | Age: 79
End: 2024-02-08
Payer: MEDICARE

## 2024-02-08 VITALS
SYSTOLIC BLOOD PRESSURE: 126 MMHG | HEART RATE: 77 BPM | RESPIRATION RATE: 18 BRPM | TEMPERATURE: 97.4 F | OXYGEN SATURATION: 96 % | DIASTOLIC BLOOD PRESSURE: 70 MMHG

## 2024-02-08 PROCEDURE — 1090000001 HH PPS REVENUE CREDIT

## 2024-02-08 PROCEDURE — 0023 HH SOC

## 2024-02-08 PROCEDURE — 1090000002 HH PPS REVENUE DEBIT

## 2024-02-08 PROCEDURE — G0152 HHCP-SERV OF OT,EA 15 MIN: HCPCS | Mod: HHH

## 2024-02-08 PROCEDURE — G0299 HHS/HOSPICE OF RN EA 15 MIN: HCPCS | Mod: HHH

## 2024-02-08 ASSESSMENT — ENCOUNTER SYMPTOMS
FORGETFULNESS: 1
LOSS OF SENSATION IN FEET: 0
DESCRIPTION OF MEMORY LOSS: SHORT TERM
OCCASIONAL FEELINGS OF UNSTEADINESS: 1
SUBJECTIVE PAIN PROGRESSION: GRADUALLY IMPROVING
DENIES PAIN: 1
LAST BOWEL MOVEMENT: 66878
APPETITE LEVEL: GOOD
DEPRESSION: 0

## 2024-02-08 ASSESSMENT — PAIN SCALES - PAIN ASSESSMENT IN ADVANCED DEMENTIA (PAINAD)
FACIALEXPRESSION: 1
TOTALSCORE: 2
CONSOLABILITY: 0 - NO NEED TO CONSOLE.
FACIALEXPRESSION: 1 - SAD. FRIGHTENED. FROWN.
BREATHING: 0
CONSOLABILITY: 0
NEGVOCALIZATION: 0
NEGVOCALIZATION: 0 - NONE.
BODYLANGUAGE: 1
BODYLANGUAGE: 1 - TENSE. DISTRESSED PACING. FIDGETING.

## 2024-02-08 ASSESSMENT — ACTIVITIES OF DAILY LIVING (ADL): DRESSING_LB_CURRENT_FUNCTION: INDEPENDENT

## 2024-02-09 PROCEDURE — 1090000001 HH PPS REVENUE CREDIT

## 2024-02-09 PROCEDURE — 1090000002 HH PPS REVENUE DEBIT

## 2024-02-10 PROCEDURE — 1090000001 HH PPS REVENUE CREDIT

## 2024-02-10 PROCEDURE — 1090000002 HH PPS REVENUE DEBIT

## 2024-02-11 PROCEDURE — 1090000002 HH PPS REVENUE DEBIT

## 2024-02-11 PROCEDURE — 1090000001 HH PPS REVENUE CREDIT

## 2024-02-12 ENCOUNTER — OFFICE VISIT (OUTPATIENT)
Dept: BEHAVIORAL HEALTH | Facility: CLINIC | Age: 79
End: 2024-02-12
Payer: MEDICARE

## 2024-02-12 ENCOUNTER — HOME CARE VISIT (OUTPATIENT)
Dept: HOME HEALTH SERVICES | Facility: HOME HEALTH | Age: 79
End: 2024-02-12
Payer: MEDICARE

## 2024-02-12 VITALS
WEIGHT: 164 LBS | SYSTOLIC BLOOD PRESSURE: 114 MMHG | HEIGHT: 66 IN | HEART RATE: 85 BPM | DIASTOLIC BLOOD PRESSURE: 72 MMHG | BODY MASS INDEX: 26.36 KG/M2

## 2024-02-12 VITALS
RESPIRATION RATE: 18 BRPM | SYSTOLIC BLOOD PRESSURE: 116 MMHG | TEMPERATURE: 98 F | DIASTOLIC BLOOD PRESSURE: 60 MMHG | OXYGEN SATURATION: 96 % | HEART RATE: 72 BPM

## 2024-02-12 DIAGNOSIS — F41.8 DEPRESSION WITH ANXIETY: ICD-10-CM

## 2024-02-12 PROBLEM — Z01.10 ENCOUNTER FOR AUDIOLOGY EVALUATION: Status: ACTIVE | Noted: 2024-02-12

## 2024-02-12 PROCEDURE — 1036F TOBACCO NON-USER: CPT | Performed by: PSYCHIATRY & NEUROLOGY

## 2024-02-12 PROCEDURE — 1159F MED LIST DOCD IN RCRD: CPT | Performed by: PSYCHIATRY & NEUROLOGY

## 2024-02-12 PROCEDURE — G0299 HHS/HOSPICE OF RN EA 15 MIN: HCPCS | Mod: HHH

## 2024-02-12 PROCEDURE — 3078F DIAST BP <80 MM HG: CPT | Performed by: PSYCHIATRY & NEUROLOGY

## 2024-02-12 PROCEDURE — 1090000002 HH PPS REVENUE DEBIT

## 2024-02-12 PROCEDURE — 1090000001 HH PPS REVENUE CREDIT

## 2024-02-12 PROCEDURE — 90792 PSYCH DIAG EVAL W/MED SRVCS: CPT | Performed by: PSYCHIATRY & NEUROLOGY

## 2024-02-12 PROCEDURE — 1126F AMNT PAIN NOTED NONE PRSNT: CPT | Performed by: PSYCHIATRY & NEUROLOGY

## 2024-02-12 PROCEDURE — 3074F SYST BP LT 130 MM HG: CPT | Performed by: PSYCHIATRY & NEUROLOGY

## 2024-02-12 PROCEDURE — 1160F RVW MEDS BY RX/DR IN RCRD: CPT | Performed by: PSYCHIATRY & NEUROLOGY

## 2024-02-12 RX ORDER — DULOXETIN HYDROCHLORIDE 30 MG/1
CAPSULE, DELAYED RELEASE ORAL
Qty: 7 CAPSULE | Refills: 0 | Status: SHIPPED | OUTPATIENT
Start: 2024-02-12 | End: 2024-03-11 | Stop reason: DRUGHIGH

## 2024-02-12 RX ORDER — DULOXETIN HYDROCHLORIDE 60 MG/1
CAPSULE, DELAYED RELEASE ORAL
Qty: 30 CAPSULE | Refills: 1 | Status: SHIPPED | OUTPATIENT
Start: 2024-02-12 | End: 2024-03-11 | Stop reason: SDUPTHER

## 2024-02-12 ASSESSMENT — ENCOUNTER SYMPTOMS
CONFUSION: 0
DENIES PAIN: 1
NAUSEA: 0
DYSPHORIC MOOD: 0
APPETITE LEVEL: GOOD
NERVOUS/ANXIOUS: 1
OCCASIONAL FEELINGS OF UNSTEADINESS: 1
DIFFICULTY URINATING: 0
LOSS OF SENSATION IN FEET: 0
DEPRESSION: 0
SEIZURES: 0
HALLUCINATIONS: 0
ARTHRALGIAS: 1
AGITATION: 0
FORGETFULNESS: 1
NUMBNESS: 1
DECREASED CONCENTRATION: 1
SLEEP DISTURBANCE: 1
HYPERACTIVE: 0

## 2024-02-12 ASSESSMENT — PAIN SCALES - PAIN ASSESSMENT IN ADVANCED DEMENTIA (PAINAD)
NEGVOCALIZATION: 0
BODYLANGUAGE: 0 - RELAXED.
FACIALEXPRESSION: 0 - SMILING OR INEXPRESSIVE.
BODYLANGUAGE: 0
CONSOLABILITY: 0
FACIALEXPRESSION: 0
BREATHING: 0
NEGVOCALIZATION: 0 - NONE.
TOTALSCORE: 0
CONSOLABILITY: 0 - NO NEED TO CONSOLE.

## 2024-02-12 NOTE — PROGRESS NOTES
Subjective   Patient ID: Toyin Oropeza is a 79 y.o. female who presents for depression .    Has been in and out of the hospital for the last two years then rehab   Feels depressed, feels lonely. Stays in her house, nothing much to do. Feels hopeless. No thoughts of suicide. Sleep is ok, she is worried she might fall of the bed though.   Anxious, worried, scared, always worried she is going to fall.   Forgetful, forget where she puts stuff in the house, forgets to take her meds in the evening, no issues with managing finances, she no longer driving in the  last 2 years. Not preparing food scared that she might pass out   On Sertraline for 22 years, for depression following her senior living due to medical. Stated it helps   Brain MRI:  Mild chronic microvascular ischemia.   Parenchyma: There is mild generalized parenchymal volume los           Past Psychiatric History:  No past inpatient admissions, no history of suicide or self harm.   No history of psychosis   Substance Abuse History:  None  Family History:  Family History   Problem Relation Name Age of Onset    Diabetes Mother      Heart failure Mother      Hypertension Mother      Lung cancer Father      Cancer Father      Throat cancer Brother      Arthritis Other      Cancer Other      Diabetes Other      Hypertension Other      Brother has bipolar disorder  Social History:  Social History     Socioeconomic History    Marital status: Single     Spouse name: Not on file    Number of children: Not on file    Years of education: Not on file    Highest education level: Not on file   Occupational History    Not on file   Tobacco Use    Smoking status: Never    Smokeless tobacco: Never   Vaping Use    Vaping Use: Never used   Substance and Sexual Activity    Alcohol use: Not on file    Drug use: Not on file    Sexual activity: Not on file   Other Topics Concern    Not on file   Social History Narrative    Not on file     Social Determinants of Health     Financial  Resource Strain: Not on file   Food Insecurity: Not on file   Transportation Needs: No Transportation Needs (1/9/2024)    OASIS : Transportation     Lack of Transportation (Medical): No     Lack of Transportation (Non-Medical): No     Patient Unable or Declines to Respond: No   Physical Activity: Not on file   Stress: Not on file   Social Connections: Feeling Socially Integrated (1/9/2024)    OASIS : Social Isolation     Frequency of experiencing loneliness or isolation: Never   Intimate Partner Violence: Not on file   Housing Stability: Not on file    Born in [place], raised by mother. Two miscarriages, no children.    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       Current Outpatient Medications on File Prior to Visit   Medication Sig Dispense Refill    albuterol 90 mcg/actuation inhaler Inhale 2 puffs 4 times a day as needed for shortness of breath. 18 g 2    amLODIPine (Norvasc) 10 mg tablet Take 1 tablet (10 mg) by mouth once daily. 90 tablet 2    atorvastatin (Lipitor) 40 mg tablet Take 1 tablet (40 mg) by mouth once daily in the evening. 90 tablet 2    benzonatate (Tessalon) 100 mg capsule Take 1 capsule (100 mg) by mouth. 2-3 TIMES DAILY AS NEEDED. DO NOT TAKE IF NOT NEEDED      ferrous sulfate, 325 mg ferrous sulfate, tablet Take 1 tablet by mouth once daily.      FreeStyle Lite Strips strip TEST ONCE DAILY      gabapentin (Neurontin) 300 mg capsule Take 1 capsule (300 mg) by mouth 3 times a day.      hydroxychloroquine (Plaquenil) 200 mg tablet Take 1 tablet (200 mg) by mouth 2 times a day with meals. 60 tablet 1    leflunomide (Arava) 10 mg tablet Take 1 tablet (10 mg) by mouth once daily. As directed      meclizine (Antivert) 12.5 mg tablet Take 1 tablet (12.5 mg) by mouth 3 times a day as needed for dizziness. 90 tablet 2    metFORMIN (Glucophage) 500 mg tablet Take 1 tablet (500 mg) by mouth once daily. 90  tablet 1    metoprolol tartrate (Lopressor) 25 mg tablet Take 1 tablet (25 mg) by mouth 2 times a day. 180 tablet 2    oxybutynin XL (Ditropan-XL) 5 mg 24 hr tablet Take 1 tablet (5 mg) by mouth once daily. 90 tablet 2    sertraline (Zoloft) 100 mg tablet Take 1 tablet (100 mg) by mouth once daily. 90 tablet 1     No current facility-administered medications on file prior to visit.      Patient Active Problem List   Diagnosis    Abnormal blood chemistry    Abnormal echocardiogram    Accident due to mechanical fall without injury    BRIGID (acute kidney injury) (CMS/HCC)    Anemia    Arthrosis of multiple sites    Bilateral impacted cerumen    Bilateral sensorineural hearing loss    Chronic neck pain    Chronic pain    Combined form of age-related cataract, both eyes    Combined forms of age-related cataract, left eye    Cough    Dyspnea    Dizziness    Diabetic peripheral neuropathy (CMS/HCC)    Diabetes mellitus (CMS/HCC)    Depressed mood    Depression with anxiety    Excessive sweating    Feltys syndrome (CMS/HCC)    Gait instability    Hair loss    Hearing loss    Hyperlipidemia LDL goal <100    Hyperlipidemia    Hypertension    Late onset Alzheimer's dementia without behavioral disturbance (CMS/HCC)    Left foot pain    Low back pain    Lower extremity weakness    Lumbar spondylosis    Lumbar stenosis with neurogenic claudication    Lumbar post-laminectomy syndrome    Lumbosacral neuritis    Neuropathy    Nodular lesion on surface of skin    Left hip pain    Obesity    Onychomycosis    Impacted cerumen of right ear    Osteoarthritis    Other specified disorders of bone density and structure, multiple sites    Pseudophakia of both eyes    Rash    Rheumatoid nodules (CMS/HCC)    Rheumatoid arthritis (CMS/HCC)    Stage 2 chronic kidney disease    Urinary incontinence, unspecified type    Vitamin D deficiency    Change in mental status    Cyst of Bartholin's gland    Essential hypertension, benign    Osteopenia     Other symbolic dysfunctions    Scoliosis    Malnutrition of moderate degree (CMS/HCC)    Unspecified protein-calorie malnutrition (CMS/HCC)    UTI (urinary tract infection)    Interstitial pulmonary disease, unspecified (CMS/HCC)    Depression, recurrent (CMS/HCC)    Encounter for audiology evaluation          Review of Systems   HENT:  Positive for hearing loss.         Vertigo   Gastrointestinal:  Negative for nausea.   Genitourinary:  Negative for difficulty urinating.   Musculoskeletal:  Positive for arthralgias and gait problem.   Neurological:  Positive for numbness. Negative for seizures.   Psychiatric/Behavioral:  Positive for decreased concentration and sleep disturbance. Negative for agitation, behavioral problems, confusion, dysphoric mood, hallucinations, self-injury and suicidal ideas. The patient is nervous/anxious. The patient is not hyperactive.        Objective   Vitals:    02/12/24 1351   BP: 114/72   Pulse: 85      Physical Exam  Psychiatric:         Attention and Perception: Attention normal.         Mood and Affect: Mood is anxious.         Speech: Speech normal.         Behavior: Behavior normal.         Thought Content: Thought content normal.         Judgment: Judgment normal.      Comments: W-O-R-D  D-R-O-W  Presidents: Leslie[place]abe[place]wood   Off note, would benefit from MOCA but due to hearing loss, will defer MOCA until hearing is corrected          Lab Review:   Lab on 01/09/2024   Component Date Value    Vitamin B1, Whole Blood 01/09/2024 53 (L)     WBC 01/09/2024 4.2 (L)     nRBC 01/09/2024 0.0     RBC 01/09/2024 4.08     Hemoglobin 01/09/2024 10.3 (L)     Hematocrit 01/09/2024 33.1 (L)     MCV 01/09/2024 81     MCH 01/09/2024 25.2 (L)     MCHC 01/09/2024 31.1 (L)     RDW 01/09/2024 16.0 (H)     Platelets 01/09/2024 227     Glucose 01/09/2024 103 (H)     Sodium 01/09/2024 144     Potassium 01/09/2024 4.6     Chloride 01/09/2024 107     Bicarbonate 01/09/2024 29     Anion Gap 01/09/2024 13      Urea Nitrogen 01/09/2024 16     Creatinine 01/09/2024 1.25 (H)     eGFR 01/09/2024 44 (L)     Calcium 01/09/2024 9.6     Albumin 01/09/2024 4.1     Alkaline Phosphatase 01/09/2024 99     Total Protein 01/09/2024 7.6     AST 01/09/2024 15     Bilirubin, Total 01/09/2024 0.4     ALT 01/09/2024 11     Hemoglobin A1C 01/09/2024 6.2 (H)     Estimated Average Glucose 01/09/2024 131     Albumin, Urine Random 01/09/2024 7.2     Creatinine, Urine Random 01/09/2024 98.9     Albumin/Creatine Ratio 01/09/2024 7.3     Cholesterol 01/09/2024 147     HDL-Cholesterol 01/09/2024 56.2     Cholesterol/HDL Ratio 01/09/2024 2.6     LDL Calculated 01/09/2024 73     VLDL 01/09/2024 17     Triglycerides 01/09/2024 87     Non HDL Cholesterol 01/09/2024 91      Lab Results   Component Value Date     01/09/2024     02/17/2023     12/20/2022     11/18/2022    K 4.6 01/09/2024    K 4.4 02/17/2023    K 3.9 12/20/2022    K 4.3 11/18/2022    CO2 29 01/09/2024    CO2 29 02/17/2023    CO2 19 (L) 12/20/2022    CO2 24 11/18/2022    BUN 16 01/09/2024    BUN 16 02/17/2023    BUN 13 12/20/2022    BUN 9 11/18/2022    CREATININE 1.25 (H) 01/09/2024    CREATININE 1.39 (H) 02/17/2023    CREATININE 1.11 (H) 12/20/2022    CREATININE 1.15 (H) 11/18/2022    GLUCOSE 103 (H) 01/09/2024    GLUCOSE 110 (H) 02/17/2023    GLUCOSE 64 (L) 12/20/2022    GLUCOSE 88 11/18/2022    CALCIUM 9.6 01/09/2024    CALCIUM 9.3 02/17/2023    CALCIUM 8.1 (L) 12/20/2022    CALCIUM 9.4 11/18/2022     Lab Results   Component Value Date    WBC 4.2 (L) 01/09/2024    HGB 10.3 (L) 01/09/2024    HCT 33.1 (L) 01/09/2024    MCV 81 01/09/2024     01/09/2024     Lab Results   Component Value Date    CHOL 147 01/09/2024    TRIG 87 01/09/2024    HDL 56.2 01/09/2024       Assessment/Plan   Problem List Items Addressed This Visit       Depression with anxiety     Discontinue Sertraline   Start Duloxetine 30 mg po daily for week 1 then 60 mg po daily thereafter   Refer  to hearing eval, she has hearing aids but can not wear them, stated it gets worse if she wears them   Will do cognitive screening when her hearing is corrected   Follow up in 1 month or sooner if needed    Radha Mir MD

## 2024-02-13 ENCOUNTER — HOME CARE VISIT (OUTPATIENT)
Dept: HOME HEALTH SERVICES | Facility: HOME HEALTH | Age: 79
End: 2024-02-13
Payer: MEDICARE

## 2024-02-13 PROCEDURE — 1090000002 HH PPS REVENUE DEBIT

## 2024-02-13 PROCEDURE — 1090000001 HH PPS REVENUE CREDIT

## 2024-02-14 PROCEDURE — 1090000002 HH PPS REVENUE DEBIT

## 2024-02-14 PROCEDURE — 1090000001 HH PPS REVENUE CREDIT

## 2024-02-15 ENCOUNTER — HOME CARE VISIT (OUTPATIENT)
Dept: HOME HEALTH SERVICES | Facility: HOME HEALTH | Age: 79
End: 2024-02-15
Payer: MEDICARE

## 2024-02-15 PROCEDURE — G0155 HHCP-SVS OF CSW,EA 15 MIN: HCPCS | Mod: HHH

## 2024-02-15 PROCEDURE — 1090000001 HH PPS REVENUE CREDIT

## 2024-02-15 PROCEDURE — 1090000002 HH PPS REVENUE DEBIT

## 2024-02-15 SDOH — HEALTH STABILITY: MENTAL HEALTH: STRESS FACTORS COMMENTS: NEED FOR ADDL CARE

## 2024-02-15 SDOH — HEALTH STABILITY: MENTAL HEALTH: SOCIAL ISOLATION: 1

## 2024-02-15 ASSESSMENT — ACTIVITIES OF DAILY LIVING (ADL)
AMBULATION_REQUIRES_ASSISTANCE: 1
LAUNDRY_REQUIRES_ASSISTANCE: 1
SHOPPING_REQUIRES_ASSISTANCE: 1

## 2024-02-15 ASSESSMENT — ENCOUNTER SYMPTOMS: AGITATION: 1

## 2024-02-16 PROCEDURE — 1090000002 HH PPS REVENUE DEBIT

## 2024-02-16 PROCEDURE — 1090000001 HH PPS REVENUE CREDIT

## 2024-02-17 PROCEDURE — 1090000002 HH PPS REVENUE DEBIT

## 2024-02-17 PROCEDURE — 1090000001 HH PPS REVENUE CREDIT

## 2024-02-18 PROCEDURE — 1090000002 HH PPS REVENUE DEBIT

## 2024-02-18 PROCEDURE — 1090000001 HH PPS REVENUE CREDIT

## 2024-02-19 PROCEDURE — 1090000002 HH PPS REVENUE DEBIT

## 2024-02-19 PROCEDURE — 1090000001 HH PPS REVENUE CREDIT

## 2024-02-20 ENCOUNTER — HOME CARE VISIT (OUTPATIENT)
Dept: HOME HEALTH SERVICES | Facility: HOME HEALTH | Age: 79
End: 2024-02-20
Payer: MEDICARE

## 2024-02-20 VITALS
TEMPERATURE: 97 F | RESPIRATION RATE: 18 BRPM | OXYGEN SATURATION: 95 % | DIASTOLIC BLOOD PRESSURE: 70 MMHG | HEART RATE: 74 BPM | SYSTOLIC BLOOD PRESSURE: 122 MMHG

## 2024-02-20 PROCEDURE — 1090000002 HH PPS REVENUE DEBIT

## 2024-02-20 PROCEDURE — 1090000001 HH PPS REVENUE CREDIT

## 2024-02-20 PROCEDURE — G0299 HHS/HOSPICE OF RN EA 15 MIN: HCPCS | Mod: HHH

## 2024-02-20 ASSESSMENT — ENCOUNTER SYMPTOMS
OCCASIONAL FEELINGS OF UNSTEADINESS: 1
APPETITE LEVEL: FAIR
DEPRESSION: 1
FORGETFULNESS: 1
DESCRIPTION OF MEMORY LOSS: SHORT TERM
LOSS OF SENSATION IN FEET: 0
DENIES PAIN: 1

## 2024-02-20 ASSESSMENT — PAIN SCALES - PAIN ASSESSMENT IN ADVANCED DEMENTIA (PAINAD)
TOTALSCORE: 0
FACIALEXPRESSION: 0
BODYLANGUAGE: 0
BREATHING: 0
BODYLANGUAGE: 0 - RELAXED.
NEGVOCALIZATION: 0 - NONE.
FACIALEXPRESSION: 0 - SMILING OR INEXPRESSIVE.
CONSOLABILITY: 0
NEGVOCALIZATION: 0
CONSOLABILITY: 0 - NO NEED TO CONSOLE.

## 2024-02-21 PROCEDURE — 1090000002 HH PPS REVENUE DEBIT

## 2024-02-21 PROCEDURE — 1090000001 HH PPS REVENUE CREDIT

## 2024-02-22 PROCEDURE — 1090000002 HH PPS REVENUE DEBIT

## 2024-02-22 PROCEDURE — 1090000001 HH PPS REVENUE CREDIT

## 2024-02-23 PROCEDURE — 1090000001 HH PPS REVENUE CREDIT

## 2024-02-23 PROCEDURE — 1090000002 HH PPS REVENUE DEBIT

## 2024-02-24 PROCEDURE — 1090000001 HH PPS REVENUE CREDIT

## 2024-02-24 PROCEDURE — 1090000002 HH PPS REVENUE DEBIT

## 2024-02-25 PROCEDURE — 1090000001 HH PPS REVENUE CREDIT

## 2024-02-25 PROCEDURE — 1090000002 HH PPS REVENUE DEBIT

## 2024-02-26 PROCEDURE — 1090000002 HH PPS REVENUE DEBIT

## 2024-02-26 PROCEDURE — 1090000001 HH PPS REVENUE CREDIT

## 2024-02-27 PROCEDURE — 1090000002 HH PPS REVENUE DEBIT

## 2024-02-27 PROCEDURE — 1090000001 HH PPS REVENUE CREDIT

## 2024-02-28 PROCEDURE — 1090000002 HH PPS REVENUE DEBIT

## 2024-02-28 PROCEDURE — 1090000001 HH PPS REVENUE CREDIT

## 2024-02-29 PROCEDURE — 1090000001 HH PPS REVENUE CREDIT

## 2024-02-29 PROCEDURE — 1090000002 HH PPS REVENUE DEBIT

## 2024-03-01 PROCEDURE — 1090000002 HH PPS REVENUE DEBIT

## 2024-03-01 PROCEDURE — 1090000001 HH PPS REVENUE CREDIT

## 2024-03-02 PROCEDURE — 1090000001 HH PPS REVENUE CREDIT

## 2024-03-02 PROCEDURE — 1090000002 HH PPS REVENUE DEBIT

## 2024-03-03 PROCEDURE — 1090000002 HH PPS REVENUE DEBIT

## 2024-03-03 PROCEDURE — 1090000001 HH PPS REVENUE CREDIT

## 2024-03-04 ENCOUNTER — OFFICE VISIT (OUTPATIENT)
Dept: PRIMARY CARE | Facility: CLINIC | Age: 79
End: 2024-03-04
Payer: MEDICARE

## 2024-03-04 VITALS
SYSTOLIC BLOOD PRESSURE: 124 MMHG | OXYGEN SATURATION: 94 % | HEIGHT: 66 IN | BODY MASS INDEX: 26.03 KG/M2 | DIASTOLIC BLOOD PRESSURE: 74 MMHG | HEART RATE: 79 BPM | WEIGHT: 162 LBS

## 2024-03-04 DIAGNOSIS — Z99.89 DEPENDENT ON WALKER FOR AMBULATION: ICD-10-CM

## 2024-03-04 DIAGNOSIS — I10 ESSENTIAL HYPERTENSION, BENIGN: ICD-10-CM

## 2024-03-04 DIAGNOSIS — E44.0 MALNUTRITION OF MODERATE DEGREE (MULTI): ICD-10-CM

## 2024-03-04 DIAGNOSIS — D64.9 ANEMIA, UNSPECIFIED TYPE: ICD-10-CM

## 2024-03-04 DIAGNOSIS — F32.A DEPRESSION, UNSPECIFIED DEPRESSION TYPE: ICD-10-CM

## 2024-03-04 DIAGNOSIS — N18.30 STAGE 3 CHRONIC KIDNEY DISEASE, UNSPECIFIED WHETHER STAGE 3A OR 3B CKD (MULTI): ICD-10-CM

## 2024-03-04 DIAGNOSIS — G30.1 LATE ONSET ALZHEIMER'S DEMENTIA WITHOUT BEHAVIORAL DISTURBANCE (MULTI): ICD-10-CM

## 2024-03-04 DIAGNOSIS — F33.9 DEPRESSION, RECURRENT (CMS-HCC): ICD-10-CM

## 2024-03-04 DIAGNOSIS — E11.9 TYPE 2 DIABETES MELLITUS WITHOUT COMPLICATION, UNSPECIFIED WHETHER LONG TERM INSULIN USE (MULTI): Primary | ICD-10-CM

## 2024-03-04 DIAGNOSIS — M06.9 RHEUMATOID ARTHRITIS, INVOLVING UNSPECIFIED SITE, UNSPECIFIED WHETHER RHEUMATOID FACTOR PRESENT (MULTI): ICD-10-CM

## 2024-03-04 DIAGNOSIS — F02.80 LATE ONSET ALZHEIMER'S DEMENTIA WITHOUT BEHAVIORAL DISTURBANCE (MULTI): ICD-10-CM

## 2024-03-04 DIAGNOSIS — E78.5 HYPERLIPIDEMIA, UNSPECIFIED HYPERLIPIDEMIA TYPE: ICD-10-CM

## 2024-03-04 PROBLEM — I21.9 MYOCARDIAL INFARCTION (MULTI): Status: ACTIVE | Noted: 2024-03-04
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
## 2024-03-04 PROBLEM — H52.13 MYOPIA OF BOTH EYES: Status: ACTIVE | Noted: 2024-03-04

## 2024-03-04 PROBLEM — R55 SYNCOPE: Status: ACTIVE | Noted: 2024-03-04

## 2024-03-04 PROCEDURE — 3074F SYST BP LT 130 MM HG: CPT | Performed by: STUDENT IN AN ORGANIZED HEALTH CARE EDUCATION/TRAINING PROGRAM

## 2024-03-04 PROCEDURE — 90662 IIV NO PRSV INCREASED AG IM: CPT | Performed by: STUDENT IN AN ORGANIZED HEALTH CARE EDUCATION/TRAINING PROGRAM

## 2024-03-04 PROCEDURE — 1090000001 HH PPS REVENUE CREDIT

## 2024-03-04 PROCEDURE — G0439 PPPS, SUBSEQ VISIT: HCPCS | Performed by: STUDENT IN AN ORGANIZED HEALTH CARE EDUCATION/TRAINING PROGRAM

## 2024-03-04 PROCEDURE — G0008 ADMIN INFLUENZA VIRUS VAC: HCPCS | Performed by: STUDENT IN AN ORGANIZED HEALTH CARE EDUCATION/TRAINING PROGRAM

## 2024-03-04 PROCEDURE — 1126F AMNT PAIN NOTED NONE PRSNT: CPT | Performed by: STUDENT IN AN ORGANIZED HEALTH CARE EDUCATION/TRAINING PROGRAM

## 2024-03-04 PROCEDURE — 1036F TOBACCO NON-USER: CPT | Performed by: STUDENT IN AN ORGANIZED HEALTH CARE EDUCATION/TRAINING PROGRAM

## 2024-03-04 PROCEDURE — 3078F DIAST BP <80 MM HG: CPT | Performed by: STUDENT IN AN ORGANIZED HEALTH CARE EDUCATION/TRAINING PROGRAM

## 2024-03-04 PROCEDURE — 1160F RVW MEDS BY RX/DR IN RCRD: CPT | Performed by: STUDENT IN AN ORGANIZED HEALTH CARE EDUCATION/TRAINING PROGRAM

## 2024-03-04 PROCEDURE — 1159F MED LIST DOCD IN RCRD: CPT | Performed by: STUDENT IN AN ORGANIZED HEALTH CARE EDUCATION/TRAINING PROGRAM

## 2024-03-04 PROCEDURE — 1090000002 HH PPS REVENUE DEBIT

## 2024-03-04 PROCEDURE — 1124F ACP DISCUSS-NO DSCNMKR DOCD: CPT | Performed by: STUDENT IN AN ORGANIZED HEALTH CARE EDUCATION/TRAINING PROGRAM

## 2024-03-04 PROCEDURE — 99214 OFFICE O/P EST MOD 30 MIN: CPT | Performed by: STUDENT IN AN ORGANIZED HEALTH CARE EDUCATION/TRAINING PROGRAM

## 2024-03-04 PROCEDURE — 1170F FXNL STATUS ASSESSED: CPT | Performed by: STUDENT IN AN ORGANIZED HEALTH CARE EDUCATION/TRAINING PROGRAM

## 2024-03-04 ASSESSMENT — ACTIVITIES OF DAILY LIVING (ADL)
BATHING: INDEPENDENT
GROCERY_SHOPPING: NEEDS ASSISTANCE
MANAGING_FINANCES: NEEDS ASSISTANCE
DOING_HOUSEWORK: NEEDS ASSISTANCE
TAKING_MEDICATION: NEEDS ASSISTANCE
DRESSING: INDEPENDENT

## 2024-03-04 ASSESSMENT — ENCOUNTER SYMPTOMS
OCCASIONAL FEELINGS OF UNSTEADINESS: 0
DEPRESSION: 0
LOSS OF SENSATION IN FEET: 0

## 2024-03-04 ASSESSMENT — PATIENT HEALTH QUESTIONNAIRE - PHQ9
2. FEELING DOWN, DEPRESSED OR HOPELESS: NOT AT ALL
SUM OF ALL RESPONSES TO PHQ9 QUESTIONS 1 AND 2: 0
1. LITTLE INTEREST OR PLEASURE IN DOING THINGS: NOT AT ALL

## 2024-03-04 NOTE — PROGRESS NOTES
"Subjective   Reason for Visit: Toyin Oropeza is an 79 y.o. female here for a Medicare Wellness visit.          Reviewed all medications by prescribing practitioner or clinical pharmacist (such as prescriptions, OTCs, herbal therapies and supplements) and documented in the medical record.    HPI    Patient Care Team:  Christopher D'Amico, DO as PCP - General     Review of Systems    Objective   Vitals:  Ht 1.676 m (5' 6\")   Wt 73.5 kg (162 lb)   BMI 26.15 kg/m²       Physical Exam    Assessment/Plan   Problem List Items Addressed This Visit    None           HPI: 79-year-old female presenting for follow-up on chronic conditions, Medicare annual wellness exam.  At baseline without any significant new concerns or interval changes.    OA, RA with Felty  Following with rheumatology, worsening.  Having significant difficulties around the house with ADLs.  Requesting home health aide     DMII  Stable     Anemia  Stable     CKD  Stable     HTN  Stable     HLD  Stable     Depression  Stable, following with psychiatry    12 point ROS reviewed and negative other than as stated in HPI      General: Alert, oriented, pleasant, in no acute distress  HEENT:      Head: normocephalic, atraumatic;      eyes: EOMI, no scleral icterus;   Neck: soft, supple, non-tender, no masses appreciated  CV: Heart with regular rate and rhythm, normal S1/S2, no murmurs  Lungs: CTAB without wheezing, rhonchi or rales; good respiratory effort, no increased work of breathing  Abdomen: soft, non-tender, non-distended, no masses appreciated  Extremities: no edema, no cyanosis  Neuro: Cranial nerves grossly intact; alert and oriented, ambulates with rollator  Psych: Appropriate mood and affect    # HM  -Labs recent and reviewed  -Vaccines:       Flu: IO      Shingrix: Recommended, advised to go to local pharmacy      Pneumococcal: UTD      Tdap: Recommended, advised to go to local pharmacy  -Gerson w/ raheem: no longer screening  -Colonoscopy: no longer " screening  -Osteoporosis screenin2023    #OA #RA with Felty #falls  -Following with rheum, continue medication regimen per specialist  -Rollator dependent  -Is supposed to be starting assisted living soon     #DMII  -Last A1c at goal, continue metformin 500 mg daily     #Anemia  -Stable    #CKD  -Stable     #HTN  -At goal in office  -Continue current regimen     #HLD  -Continue current regimen     #Depression  -Continue current regimen through psychiatry    F/U 4 months, sooner if indicated    Chris D'Amico, DO

## 2024-03-05 PROCEDURE — 1090000002 HH PPS REVENUE DEBIT

## 2024-03-05 PROCEDURE — 1090000001 HH PPS REVENUE CREDIT

## 2024-03-06 ENCOUNTER — HOME CARE VISIT (OUTPATIENT)
Dept: HOME HEALTH SERVICES | Facility: HOME HEALTH | Age: 79
End: 2024-03-06
Payer: MEDICARE

## 2024-03-06 PROCEDURE — 1090000002 HH PPS REVENUE DEBIT

## 2024-03-06 PROCEDURE — 1090000001 HH PPS REVENUE CREDIT

## 2024-03-06 ASSESSMENT — PAIN SCALES - PAIN ASSESSMENT IN ADVANCED DEMENTIA (PAINAD)
CONSOLABILITY: 0 - NO NEED TO CONSOLE.
FACIALEXPRESSION: 0
NEGVOCALIZATION: 0 - NONE.
TOTALSCORE: 0
BODYLANGUAGE: 0
NEGVOCALIZATION: 0
BODYLANGUAGE: 0 - RELAXED.
FACIALEXPRESSION: 0 - SMILING OR INEXPRESSIVE.
BREATHING: 0
CONSOLABILITY: 0

## 2024-03-06 ASSESSMENT — ACTIVITIES OF DAILY LIVING (ADL)
OASIS_M1830: 02
HOME_HEALTH_OASIS: 01

## 2024-03-06 ASSESSMENT — ENCOUNTER SYMPTOMS: DENIES PAIN: 1

## 2024-03-07 ENCOUNTER — CLINICAL SUPPORT (OUTPATIENT)
Dept: AUDIOLOGY | Facility: CLINIC | Age: 79
End: 2024-03-07
Payer: MEDICARE

## 2024-03-07 DIAGNOSIS — H61.23 BILATERAL IMPACTED CERUMEN: ICD-10-CM

## 2024-03-07 DIAGNOSIS — H90.3 SENSORINEURAL HEARING LOSS (SNHL) OF BOTH EARS: Primary | ICD-10-CM

## 2024-03-07 PROCEDURE — 92567 TYMPANOMETRY: CPT | Performed by: AUDIOLOGIST

## 2024-03-07 NOTE — PROGRESS NOTES
Chief Complaint   Patient presents with    Hearing Loss    Cerumen Impaction     Toyin Oropeza, age 79 years, was scheduled for repeat audiogram on 3/7/2024.  Ms. Oropeza presents with documented bilateral hearing loss.  She was fit with binaural [place] in the ear hearing aids through [facility] one year ago.   Ms. Oropeza reports she has been in a skilled nursing facility for rehabilitation of UTI and vertigo.  She uses a walker.  She feels her hearing has decreased.    Otoscopic examination revealed occluding cerumen.  This was verified with tympanometry.  Otolaryngology did not have openings today to complete cerumen removal.  Ms. Oropeza was provided scheduling number for otolaryngology to schedule cerumen removal followed by audiogram.  This information was also provided to Ms. Oropeza's bother.    Treatment plan   Cerumen removal  Update audiogram following cerumen removal  Return to [facility] for hearing aid adjustments    Time: 002 [phone] [id number]

## 2024-03-11 ENCOUNTER — OFFICE VISIT (OUTPATIENT)
Dept: BEHAVIORAL HEALTH | Facility: CLINIC | Age: 79
End: 2024-03-11
Payer: MEDICARE

## 2024-03-11 VITALS
HEART RATE: 75 BPM | WEIGHT: 150 LBS | HEIGHT: 66 IN | SYSTOLIC BLOOD PRESSURE: 109 MMHG | BODY MASS INDEX: 24.11 KG/M2 | DIASTOLIC BLOOD PRESSURE: 67 MMHG

## 2024-03-11 DIAGNOSIS — F41.8 DEPRESSION WITH ANXIETY: ICD-10-CM

## 2024-03-11 PROBLEM — H61.23 IMPACTED CERUMEN, BILATERAL: Status: ACTIVE | Noted: 2023-03-01

## 2024-03-11 PROBLEM — H90.3 SENSORINEURAL HEARING LOSS, BILATERAL: Status: ACTIVE | Noted: 2023-03-02

## 2024-03-11 PROCEDURE — 3078F DIAST BP <80 MM HG: CPT | Performed by: PSYCHIATRY & NEUROLOGY

## 2024-03-11 PROCEDURE — 99213 OFFICE O/P EST LOW 20 MIN: CPT | Performed by: PSYCHIATRY & NEUROLOGY

## 2024-03-11 PROCEDURE — 1036F TOBACCO NON-USER: CPT | Performed by: PSYCHIATRY & NEUROLOGY

## 2024-03-11 PROCEDURE — 1159F MED LIST DOCD IN RCRD: CPT | Performed by: PSYCHIATRY & NEUROLOGY

## 2024-03-11 PROCEDURE — 1160F RVW MEDS BY RX/DR IN RCRD: CPT | Performed by: PSYCHIATRY & NEUROLOGY

## 2024-03-11 PROCEDURE — 3074F SYST BP LT 130 MM HG: CPT | Performed by: PSYCHIATRY & NEUROLOGY

## 2024-03-11 PROCEDURE — 1126F AMNT PAIN NOTED NONE PRSNT: CPT | Performed by: PSYCHIATRY & NEUROLOGY

## 2024-03-11 RX ORDER — DULOXETIN HYDROCHLORIDE 60 MG/1
CAPSULE, DELAYED RELEASE ORAL
Qty: 30 CAPSULE | Refills: 2 | Status: SHIPPED | OUTPATIENT
Start: 2024-03-11 | End: 2024-03-18

## 2024-03-11 ASSESSMENT — ENCOUNTER SYMPTOMS
HALLUCINATIONS: 0
HYPERACTIVE: 0
SLEEP DISTURBANCE: 0
DECREASED CONCENTRATION: 1
AGITATION: 0
DYSPHORIC MOOD: 1
NERVOUS/ANXIOUS: 1
CONFUSION: 0

## 2024-03-11 NOTE — PROGRESS NOTES
Subjective   Patient ID: Toyin Oropeza is a 79 y.o. female who presents for follow uip  We stopped Sertraline, and switched to Duloxetine,   Feels frustrated with the current living situation. Trying to get to an assisted living. She has a  to help with her current situation. Stated her brother gave away every thing, she does not know where they are. No thoughts of suicide   She was hard of hearing, her hearing aid are not working. I was not able to do MoCA. She has an chilango with audiology in 2 months           Review of Systems   HENT:  Positive for hearing loss.    Psychiatric/Behavioral:  Positive for decreased concentration and dysphoric mood. Negative for agitation, behavioral problems, confusion, hallucinations, self-injury, sleep disturbance and suicidal ideas. The patient is nervous/anxious. The patient is not hyperactive.        Objective   Physical Exam  Psychiatric:         Attention and Perception: Attention and perception normal.         Mood and Affect: Mood is depressed.         Speech: Speech normal.         Behavior: Behavior normal. Behavior is cooperative.         Thought Content: Thought content normal.         Cognition and Memory: Memory normal. Cognition is impaired.       There were no vitals filed for this visit.   Lab on 01/09/2024   Component Date Value    Vitamin B1, Whole Blood 01/09/2024 53 (L)     WBC 01/09/2024 4.2 (L)     nRBC 01/09/2024 0.0     RBC 01/09/2024 4.08     Hemoglobin 01/09/2024 10.3 (L)     Hematocrit 01/09/2024 33.1 (L)     MCV 01/09/2024 81     MCH 01/09/2024 25.2 (L)     MCHC 01/09/2024 31.1 (L)     RDW 01/09/2024 16.0 (H)     Platelets 01/09/2024 227     Glucose 01/09/2024 103 (H)     Sodium 01/09/2024 144     Potassium 01/09/2024 4.6     Chloride 01/09/2024 107     Bicarbonate 01/09/2024 29     Anion Gap 01/09/2024 13     Urea Nitrogen 01/09/2024 16     Creatinine 01/09/2024 1.25 (H)     eGFR 01/09/2024 44 (L)     Calcium 01/09/2024 9.6     Albumin  01/09/2024 4.1     Alkaline Phosphatase 01/09/2024 99     Total Protein 01/09/2024 7.6     AST 01/09/2024 15     Bilirubin, Total 01/09/2024 0.4     ALT 01/09/2024 11     Hemoglobin A1C 01/09/2024 6.2 (H)     Estimated Average Glucose 01/09/2024 131     Albumin, Urine Random 01/09/2024 7.2     Creatinine, Urine Random 01/09/2024 98.9     Albumin/Creatine Ratio 01/09/2024 7.3     Cholesterol 01/09/2024 147     HDL-Cholesterol 01/09/2024 56.2     Cholesterol/HDL Ratio 01/09/2024 2.6     LDL Calculated 01/09/2024 73     VLDL 01/09/2024 17     Triglycerides 01/09/2024 87     Non HDL Cholesterol 01/09/2024 91      Lab Results   Component Value Date     01/09/2024     02/17/2023     12/20/2022     11/18/2022    K 4.6 01/09/2024    K 4.4 02/17/2023    K 3.9 12/20/2022    K 4.3 11/18/2022    CO2 29 01/09/2024    CO2 29 02/17/2023    CO2 19 (L) 12/20/2022    CO2 24 11/18/2022    BUN 16 01/09/2024    BUN 16 02/17/2023    BUN 13 12/20/2022    BUN 9 11/18/2022    CREATININE 1.25 (H) 01/09/2024    CREATININE 1.39 (H) 02/17/2023    CREATININE 1.11 (H) 12/20/2022    CREATININE 1.15 (H) 11/18/2022    GLUCOSE 103 (H) 01/09/2024    GLUCOSE 110 (H) 02/17/2023    GLUCOSE 64 (L) 12/20/2022    GLUCOSE 88 11/18/2022    CALCIUM 9.6 01/09/2024    CALCIUM 9.3 02/17/2023    CALCIUM 8.1 (L) 12/20/2022    CALCIUM 9.4 11/18/2022     Lab Results   Component Value Date    WBC 4.2 (L) 01/09/2024    HGB 10.3 (L) 01/09/2024    HCT 33.1 (L) 01/09/2024    MCV 81 01/09/2024     01/09/2024     Lab Results   Component Value Date    CHOL 147 01/09/2024    TRIG 87 01/09/2024    HDL 56.2 01/09/2024     Current Outpatient Medications on File Prior to Visit   Medication Sig Dispense Refill    albuterol 90 mcg/actuation inhaler Inhale 2 puffs 4 times a day as needed for shortness of breath. 18 g 2    amLODIPine (Norvasc) 10 mg tablet Take 1 tablet (10 mg) by mouth once daily. 90 tablet 2    atorvastatin (Lipitor) 40 mg tablet Take  1 tablet (40 mg) by mouth once daily in the evening. 90 tablet 2    DULoxetine (Cymbalta) 30 mg DR capsule Do not crush or chew.1 cap po daily for week 1 7 capsule 0    DULoxetine (Cymbalta) 60 mg DR capsule Do not crush or chew. Start on week 2 and thereafter 30 capsule 1    ferrous sulfate, 325 mg ferrous sulfate, tablet Take 1 tablet by mouth once daily.      FreeStyle Lite Strips strip TEST ONCE DAILY      gabapentin (Neurontin) 300 mg capsule Take 1 capsule (300 mg) by mouth 3 times a day.      hydroxychloroquine (Plaquenil) 200 mg tablet Take 1 tablet (200 mg) by mouth 2 times a day with meals. 60 tablet 1    meclizine (Antivert) 12.5 mg tablet Take 1 tablet (12.5 mg) by mouth 3 times a day as needed for dizziness. 90 tablet 2    metFORMIN (Glucophage) 500 mg tablet Take 1 tablet (500 mg) by mouth once daily. 90 tablet 1    metoprolol tartrate (Lopressor) 25 mg tablet Take 1 tablet (25 mg) by mouth 2 times a day. 180 tablet 2    oxybutynin XL (Ditropan-XL) 5 mg 24 hr tablet Take 1 tablet (5 mg) by mouth once daily. 90 tablet 2    sertraline (Zoloft) 100 mg tablet Take 1 tablet (100 mg) by mouth once daily. 90 tablet 1    thiamine (Vitamin B-1) 250 mg tablet Take 1 tablet (250 mg) by mouth once daily.       No current facility-administered medications on file prior to visit.      Patient Active Problem List   Diagnosis    Abnormal blood chemistry    Abnormal echocardiogram    Accident due to mechanical fall without injury    BRIGID (acute kidney injury) (CMS/HCC)    Anemia    Arthrosis of multiple sites    Bilateral impacted cerumen    Bilateral sensorineural hearing loss    Chronic neck pain    Chronic pain    Combined form of age-related cataract, both eyes    Combined forms of age-related cataract, left eye    Cough    Dyspnea    Dizziness    Diabetic peripheral neuropathy (CMS/HCC)    Diabetes mellitus (CMS/HCC)    Depressed mood    Depression with anxiety    Excessive sweating    Feltys syndrome (CMS/HCC)     Gait instability    Hair loss    Hearing loss    Hyperlipidemia LDL goal <100    Hyperlipidemia    Hypertension    Late onset Alzheimer's dementia without behavioral disturbance (CMS/HCC)    Left foot pain    Low back pain    Lower extremity weakness    Lumbar spondylosis    Lumbar stenosis with neurogenic claudication    Lumbar post-laminectomy syndrome    Lumbosacral neuritis    Neuropathy    Nodular lesion on surface of skin    Left hip pain    Obesity    Onychomycosis    Impacted cerumen of right ear    Osteoarthritis    Other specified disorders of bone density and structure, multiple sites    Pseudophakia of both eyes    Rash    Rheumatoid nodules (CMS/HCC)    Rheumatoid arthritis (CMS/HCC)    Stage 2 chronic kidney disease    Urinary incontinence, unspecified type    Vitamin D deficiency    Change in mental status    Cyst of Bartholin's gland    Essential hypertension, benign    Osteopenia    Other symbolic dysfunctions    Scoliosis    Malnutrition of moderate degree (CMS/HCC)    Unspecified protein-calorie malnutrition (CMS/HCC)    UTI (urinary tract infection)    Interstitial pulmonary disease, unspecified (CMS/HCC)    Depression, recurrent (CMS/HCC)    Encounter for audiology evaluation    Acquired deformity of ankle and foot    Acquired deformity of toe    Disorder of skin or subcutaneous tissue    Does mobilize using walker    Fall    Ingrowing nail    Myocardial infarction (CMS/HCC)    Myopia of both eyes    Neuralgia    Syncope    Stage 3 chronic kidney disease, unspecified whether stage 3a or 3b CKD (CMS/HCC)    Impacted cerumen, bilateral    Sensorineural hearing loss, bilateral    Ingrown nail      Assessment/Plan   Problem List Items Addressed This Visit             ICD-10-CM    Depression with anxiety F41.8     Continue Duloxetine 60  mg po daily   Will do MoCA next chilango when she wears her hearing aid   Discussed community resources   Follow up in 3 months or sooner if needed

## 2024-03-19 ENCOUNTER — TELEMEDICINE (OUTPATIENT)
Dept: PHARMACY | Facility: HOSPITAL | Age: 79
End: 2024-03-19
Payer: MEDICARE

## 2024-03-19 DIAGNOSIS — E78.5 HYPERLIPIDEMIA, UNSPECIFIED HYPERLIPIDEMIA TYPE: ICD-10-CM

## 2024-03-19 DIAGNOSIS — E44.0 MALNUTRITION OF MODERATE DEGREE (MULTI): ICD-10-CM

## 2024-03-19 DIAGNOSIS — G30.1 LATE ONSET ALZHEIMER'S DEMENTIA WITHOUT BEHAVIORAL DISTURBANCE (MULTI): ICD-10-CM

## 2024-03-19 DIAGNOSIS — F33.9 DEPRESSION, RECURRENT (CMS-HCC): ICD-10-CM

## 2024-03-19 DIAGNOSIS — M06.9 RHEUMATOID ARTHRITIS, INVOLVING UNSPECIFIED SITE, UNSPECIFIED WHETHER RHEUMATOID FACTOR PRESENT (MULTI): ICD-10-CM

## 2024-03-19 DIAGNOSIS — I10 ESSENTIAL HYPERTENSION, BENIGN: ICD-10-CM

## 2024-03-19 DIAGNOSIS — F02.80 LATE ONSET ALZHEIMER'S DEMENTIA WITHOUT BEHAVIORAL DISTURBANCE (MULTI): ICD-10-CM

## 2024-03-19 DIAGNOSIS — Z99.89 DEPENDENT ON WALKER FOR AMBULATION: ICD-10-CM

## 2024-03-19 DIAGNOSIS — D64.9 ANEMIA, UNSPECIFIED TYPE: ICD-10-CM

## 2024-03-19 DIAGNOSIS — E11.9 TYPE 2 DIABETES MELLITUS WITHOUT COMPLICATION, UNSPECIFIED WHETHER LONG TERM INSULIN USE (MULTI): ICD-10-CM

## 2024-03-19 DIAGNOSIS — F32.A DEPRESSION, UNSPECIFIED DEPRESSION TYPE: ICD-10-CM

## 2024-03-19 NOTE — PROGRESS NOTES
Pharmacist Clinic  Toyin Oropeza is a 79 y.o. female was referred to Clinical Pharmacy Team for a medication reconciliation.     Referring Provider: Christopher D'Amico, DO     HISTORY OF PRESENT ILLNESS   Patient has been in and out of facilities, each time she is discharged she has med list issues   Patient plans to go to assisted living soon    MEDICATION ASSESSMENT    Hypertension   Amlodipine 10 mg once daily   Metoprolol 25 mg twice daily   Hyperlipidemia  Atorvastatin 40 mg once daily   Overactive bladder   Oxybutynin 5 mg   Depression  Duloxetine 60 mg DR once daily   Diabetes:   Metformin 500 mg once daily   OA/RA  Hydroxychloroquine 200 mg twice daily   Health maintenance   Vitamin B-1 250 mg   Vitamin D3 1000 mg  Meclizine 12.5 mg by mouth 3 times daily as needed for dizziness     LAB REVIEW  Glucose (mg/dL)   Date Value   01/09/2024 103 (H)   02/17/2023 110 (H)   12/20/2022 64 (L)   11/18/2022 88     Hemoglobin A1C (%)   Date Value   01/09/2024 6.2 (H)   02/17/2023 5.5   08/05/2022 5.5   06/14/2022 5.4     Bicarbonate (mmol/L)   Date Value   01/09/2024 29   02/17/2023 29   12/20/2022 19 (L)   11/18/2022 24     Urea Nitrogen (mg/dL)   Date Value   01/09/2024 16   02/17/2023 16   12/20/2022 13   11/18/2022 9     Creatinine (mg/dL)   Date Value   01/09/2024 1.25 (H)   02/17/2023 1.39 (H)   12/20/2022 1.11 (H)   11/18/2022 1.15 (H)     Lab Results   Component Value Date    CHOL 147 01/09/2024    CHOL 167 02/17/2023    CHOL 113 06/15/2022     Lab Results   Component Value Date    HDL 56.2 01/09/2024    HDL 57.7 02/17/2023    HDL 44.9 06/15/2022     Lab Results   Component Value Date    LDLCALC 73 01/09/2024     Lab Results   Component Value Date    TRIG 87 01/09/2024    TRIG 129 02/17/2023    TRIG 92 06/15/2022     PATIENT EDUCATION/DISCUSSION:  - Went over all medications with patient   - Patient is overwhelmed with current pill burden, we will defer all changes at this time    - Counseled patient on MOA,  expectations, side effects, duration of therapy, contraindications, administration, and monitoring parameters  - Answered all patient questions and concerns    PLAN  1. Continue all medications as prescribed.     Clinical Pharmacist follow-up: as needed    Thank you,   Erica Judd, Raj    Verbal consent to manage patient's drug therapy was obtained from the patient. They were informed they may decline to participate or withdraw from participation in pharmacy services at any time.

## 2024-04-29 DIAGNOSIS — D64.9 ANEMIA, UNSPECIFIED TYPE: ICD-10-CM

## 2024-04-29 DIAGNOSIS — F33.9 DEPRESSION, RECURRENT (CMS-HCC): ICD-10-CM

## 2024-04-29 DIAGNOSIS — M06.9 RHEUMATOID ARTHRITIS, INVOLVING UNSPECIFIED SITE, UNSPECIFIED WHETHER RHEUMATOID FACTOR PRESENT (MULTI): ICD-10-CM

## 2024-04-29 RX ORDER — LEFLUNOMIDE 10 MG/1
10 TABLET ORAL DAILY
COMMUNITY
End: 2024-04-29 | Stop reason: SDUPTHER

## 2024-04-29 RX ORDER — SERTRALINE HYDROCHLORIDE 100 MG/1
100 TABLET, FILM COATED ORAL DAILY
COMMUNITY
End: 2024-04-29 | Stop reason: SDUPTHER

## 2024-04-29 RX ORDER — SERTRALINE HYDROCHLORIDE 100 MG/1
100 TABLET, FILM COATED ORAL DAILY
Qty: 90 TABLET | Refills: 2 | Status: SHIPPED | OUTPATIENT
Start: 2024-04-29

## 2024-04-29 RX ORDER — LEFLUNOMIDE 10 MG/1
10 TABLET ORAL DAILY
Qty: 90 TABLET | Refills: 2 | Status: SHIPPED | OUTPATIENT
Start: 2024-04-29

## 2024-04-29 RX ORDER — FERROUS SULFATE 325(65) MG
1 TABLET ORAL DAILY
Qty: 90 TABLET | Refills: 2 | Status: SHIPPED | OUTPATIENT
Start: 2024-04-29

## 2024-05-01 DIAGNOSIS — M19.90 ARTHROSIS OF MULTIPLE SITES: Primary | ICD-10-CM

## 2024-05-01 DIAGNOSIS — M06.9 RHEUMATOID ARTHRITIS, INVOLVING UNSPECIFIED SITE, UNSPECIFIED WHETHER RHEUMATOID FACTOR PRESENT (MULTI): ICD-10-CM

## 2024-05-16 ENCOUNTER — OFFICE VISIT (OUTPATIENT)
Dept: OTOLARYNGOLOGY | Facility: CLINIC | Age: 79
End: 2024-05-16
Payer: MEDICARE

## 2024-05-16 VITALS — TEMPERATURE: 95 F

## 2024-05-16 DIAGNOSIS — H90.3 SENSORINEURAL HEARING LOSS (SNHL) OF BOTH EARS: ICD-10-CM

## 2024-05-16 DIAGNOSIS — H61.23 BILATERAL IMPACTED CERUMEN: Primary | ICD-10-CM

## 2024-05-16 PROCEDURE — 1036F TOBACCO NON-USER: CPT | Performed by: NURSE PRACTITIONER

## 2024-05-16 PROCEDURE — 1159F MED LIST DOCD IN RCRD: CPT | Performed by: NURSE PRACTITIONER

## 2024-05-16 PROCEDURE — 1160F RVW MEDS BY RX/DR IN RCRD: CPT | Performed by: NURSE PRACTITIONER

## 2024-05-16 PROCEDURE — 99213 OFFICE O/P EST LOW 20 MIN: CPT | Performed by: NURSE PRACTITIONER

## 2024-05-16 PROCEDURE — 69210 REMOVE IMPACTED EAR WAX UNI: CPT | Performed by: NURSE PRACTITIONER

## 2024-05-16 ASSESSMENT — PATIENT HEALTH QUESTIONNAIRE - PHQ9
2. FEELING DOWN, DEPRESSED OR HOPELESS: NEARLY EVERY DAY
SUM OF ALL RESPONSES TO PHQ9 QUESTIONS 1 AND 2: 6
1. LITTLE INTEREST OR PLEASURE IN DOING THINGS: NEARLY EVERY DAY

## 2024-05-16 NOTE — PROGRESS NOTES
Subjective   Patient ID: Toyin Oropeza is a 79 y.o. female who presents for Cerumen Impaction.  HPI  This patient is referred for evaluation of cerumen impactions.  The patient is not accompanied by anyone.   When asked about ear pain, hearing loss, itching, discharge from ear, tinnitus, aural fullness or autophony, the patient admits to history of bilateral hearing loss.  She has bilateral hearing aids but does not often wear them due to decreased benefit.  At a recent visit, she was noted to have cerumen impactions.     When asked about a significant past otological history including history of prior ear surgery, noise exposure, exposure to ototoxic drugs or agents, and/or family history of hearing loss, the patient admits to none.    Review of Systems  A comprehensive or 10 points review of the patient´s constitutional, neurological, HEENT, pulmonary, cardiovascular and genito-urinary systems showed only those mentioned in history of present illness.    Objective   Physical Exam  Constitutional: no fever, chills, weight loss or weight gain   General appearance: Appears well, well-nourished, well groomed. No acute distress.   Communication: Normal communication   Psychiatric: Oriented to person, place and time. Normal mood and affect.   Neurologic: Cranial nerves II-XII grossly intact and symmetric bilaterally.   Head and Face:   Head: Atraumatic with no masses, lesions or scarring.   Face: Normal symmetry, no paralysis, synkinesis or facial tic. No scars or deformities.     Eyes: Conjunctiva not edematous or erythematous   Ears: External inspection of ears with no deformity, scars or masses.  Bilateral canals with cerumen impactions     Neck: Normal appearing, symmetric, trachea midline.   Cardiovascular: Examination of peripheral vascular system shows no clubbing or cyanosis.   Respiratory: No respiratory distress increased work of breathing. Inspection of the chest with symmetric chest expansion and normal  respiratory effort.   Skin: No rashes in the head or neck    Assessment/Plan     This patient presents for initial evaluation of acute acquired bilateral cerumen impaction and chronic bilateral sensorineural hearing loss.    Reassurance given that otologic exam is normal after cleaning.  She may follow-up as needed.  All questions were answered to patient's satisfaction.  This note was created using speech recognition transcription software. Despite proofreading, several typographical errors might be present that might affect the meaning of the content. Please call with any questions.           Patient ID: Toyin Oropeza is a 79 y.o. female.    Ear cerumen removal    Date/Time: 5/16/2024 1:25 PM    Performed by: SANIA Singh  Authorized by: SANIA Singh    Consent:     Consent obtained:  Verbal    Consent given by:  Patient    Risks discussed:  Pain    Alternatives discussed:  No treatment  Procedure details:     Location:  L ear and R ear    Procedure type comment:  Right angle hook and suction    Procedure outcomes: cerumen removed    Post-procedure details:     Inspection:  No bleeding, ear canal clear and TM intact    Hearing quality:  Improved    Procedure completion:  Tolerated well, no immediate complications    SANIA Singh 05/16/24 1:24 PM

## 2024-06-10 ENCOUNTER — OFFICE VISIT (OUTPATIENT)
Dept: BEHAVIORAL HEALTH | Facility: CLINIC | Age: 79
End: 2024-06-10
Payer: MEDICARE

## 2024-06-10 VITALS
TEMPERATURE: 98 F | SYSTOLIC BLOOD PRESSURE: 131 MMHG | HEIGHT: 65 IN | WEIGHT: 170.3 LBS | HEART RATE: 80 BPM | BODY MASS INDEX: 28.37 KG/M2 | DIASTOLIC BLOOD PRESSURE: 85 MMHG

## 2024-06-10 DIAGNOSIS — F09 COGNITIVE DISORDER: ICD-10-CM

## 2024-06-10 DIAGNOSIS — F41.8 DEPRESSION WITH ANXIETY: ICD-10-CM

## 2024-06-10 PROCEDURE — 1159F MED LIST DOCD IN RCRD: CPT | Performed by: PSYCHIATRY & NEUROLOGY

## 2024-06-10 PROCEDURE — 3075F SYST BP GE 130 - 139MM HG: CPT | Performed by: PSYCHIATRY & NEUROLOGY

## 2024-06-10 PROCEDURE — 99214 OFFICE O/P EST MOD 30 MIN: CPT | Performed by: PSYCHIATRY & NEUROLOGY

## 2024-06-10 PROCEDURE — 3079F DIAST BP 80-89 MM HG: CPT | Performed by: PSYCHIATRY & NEUROLOGY

## 2024-06-10 PROCEDURE — 1160F RVW MEDS BY RX/DR IN RCRD: CPT | Performed by: PSYCHIATRY & NEUROLOGY

## 2024-06-10 RX ORDER — DULOXETIN HYDROCHLORIDE 60 MG/1
CAPSULE, DELAYED RELEASE ORAL
Qty: 90 CAPSULE | Refills: 1 | Status: SHIPPED | OUTPATIENT
Start: 2024-06-10

## 2024-06-10 ASSESSMENT — ENCOUNTER SYMPTOMS
CONFUSION: 0
DYSPHORIC MOOD: 0
HALLUCINATIONS: 0
SLEEP DISTURBANCE: 0
NERVOUS/ANXIOUS: 0
DECREASED CONCENTRATION: 1
HYPERACTIVE: 0
AGITATION: 0

## 2024-06-10 NOTE — PROGRESS NOTES
Subjective   Patient ID: Toyin Oropeza is a 79 y.o. female who presents for follow up   Last chilango. We continued Cymbalta and waited for audiology testing   Was admitted to [facility] chew dyspnea   She had her ears checked, had her ears washed   Medicaid was finalized and she may go to nursing home   Memory remains impaired, forgetful. Finds hard time finding the words   MoCA:   Visuospatial:   Namin/3  Attention:   Language: 2/3  Abstraction:   Delayed recall:   Orientation:       Current Outpatient Medications on File Prior to Visit   Medication Sig Dispense Refill    albuterol 90 mcg/actuation inhaler Inhale 2 puffs 4 times a day as needed for shortness of breath. 18 g 2    amLODIPine (Norvasc) 10 mg tablet Take 1 tablet (10 mg) by mouth once daily. 90 tablet 2    atorvastatin (Lipitor) 40 mg tablet Take 1 tablet (40 mg) by mouth once daily in the evening. 90 tablet 2    DULoxetine (Cymbalta) 60 mg DR capsule TAKE 1 CAPSULE BY MOUTH ONCE DAILY. DO NOT CRUSH OR CHEW. START ON WEEK 2 AND THEREAFTER 90 capsule 0    ferrous sulfate, 325 mg ferrous sulfate, tablet Take 1 tablet by mouth once daily. 90 tablet 2    gabapentin (Neurontin) 300 mg capsule Take 1 capsule (300 mg) by mouth 3 times a day.      hydroxychloroquine (Plaquenil) 200 mg tablet Take 1 tablet (200 mg) by mouth 2 times a day with meals. 60 tablet 1    leflunomide (Arava) 10 mg tablet Take 1 tablet (10 mg) by mouth once daily. 90 tablet 2    meclizine (Antivert) 12.5 mg tablet Take 1 tablet (12.5 mg) by mouth 3 times a day as needed for dizziness. 90 tablet 2    metFORMIN (Glucophage) 500 mg tablet Take 1 tablet (500 mg) by mouth once daily. 90 tablet 1    metoprolol tartrate (Lopressor) 25 mg tablet Take 1 tablet (25 mg) by mouth 2 times a day. 180 tablet 2    oxybutynin XL (Ditropan-XL) 5 mg 24 hr tablet Take 1 tablet (5 mg) by mouth once daily. 90 tablet 2    thiamine (Vitamin B-1) 250 mg tablet Take 1 tablet (250 mg) by mouth once  daily.      sertraline (Zoloft) 100 mg tablet Take 1 tablet (100 mg) by mouth once daily. (Patient not taking: Reported on 6/10/2024) 90 tablet 2     No current facility-administered medications on file prior to visit.      Patient Active Problem List   Diagnosis    Abnormal blood chemistry    Abnormal echocardiogram    Accident due to mechanical fall without injury    BRIGID (acute kidney injury) (CMS-[facility])    Anemia    Arthrosis of multiple sites    Bilateral impacted cerumen    Bilateral sensorineural hearing loss    Chronic neck pain    Chronic pain    Combined form of age-related cataract, both eyes    Combined forms of age-related cataract, left eye    Cough    Dyspnea    Dizziness    Diabetic peripheral neuropathy (Multi)    Diabetes mellitus (Multi)    Depressed mood    Depression with anxiety    Excessive sweating    Feltys syndrome (Multi)    Gait instability    Hair loss    Hearing loss    Hyperlipidemia LDL goal <100    Hyperlipidemia    Hypertension    Late onset Alzheimer's dementia without behavioral disturbance (Multi)    Left foot pain    Low back pain    Lower extremity weakness    Lumbar spondylosis    Lumbar stenosis with neurogenic claudication    Lumbar post-laminectomy syndrome    Lumbosacral neuritis    Neuropathy    Nodular lesion on surface of skin    Left hip pain    Obesity    Onychomycosis    Impacted cerumen of right ear    Osteoarthritis    Other specified disorders of bone density and structure, multiple sites    Pseudophakia of both eyes    Rash    Rheumatoid nodules (Multi)    Rheumatoid arthritis (Multi)    Stage 2 chronic kidney disease    Urinary incontinence, unspecified type    Vitamin D deficiency    Change in mental status    Cyst of Bartholin's gland    Essential hypertension, benign    Osteopenia    Other symbolic dysfunctions    Scoliosis    Malnutrition of moderate degree (Multi)    Unspecified protein-calorie malnutrition (Multi)    UTI (urinary tract infection)     Interstitial pulmonary disease, unspecified (Multi)    Depression, recurrent (CMS-HCC)    Encounter for audiology evaluation    Acquired deformity of ankle and foot    Acquired deformity of toe    Disorder of skin or subcutaneous tissue    Does mobilize using walker    Fall    Ingrowing nail    Myocardial infarction (Multi)    Myopia of both eyes    Neuralgia    Syncope    Stage 3 chronic kidney disease, unspecified whether stage 3a or 3b CKD (Multi)    Impacted cerumen, bilateral    Sensorineural hearing loss, bilateral    Ingrown nail    Cognitive disorder      Review of Systems   HENT:  Positive for hearing loss.         Seemed beter than last time, was able to do MoCA   Musculoskeletal:  Positive for gait problem.   Psychiatric/Behavioral:  Positive for decreased concentration. Negative for agitation, behavioral problems, confusion, dysphoric mood, hallucinations, self-injury, sleep disturbance and suicidal ideas. The patient is not nervous/anxious and is not hyperactive.        Objective   Physical Exam  Psychiatric:         Attention and Perception: Attention normal.         Mood and Affect: Mood normal.         Speech: Speech normal.         Behavior: Behavior normal. Behavior is cooperative.         Thought Content: Thought content normal.         Cognition and Memory: Cognition is impaired.         Judgment: Judgment normal.       Vitals:    06/10/24 1137   BP: 131/85   Pulse: 80   Temp: 36.7 °C (98 °F)      Lab on 01/09/2024   Component Date Value    Vitamin B1, Whole Blood 01/09/2024 53 (L)     WBC 01/09/2024 4.2 (L)     nRBC 01/09/2024 0.0     RBC 01/09/2024 4.08     Hemoglobin 01/09/2024 10.3 (L)     Hematocrit 01/09/2024 33.1 (L)     MCV 01/09/2024 81     MCH 01/09/2024 25.2 (L)     MCHC 01/09/2024 31.1 (L)     RDW 01/09/2024 16.0 (H)     Platelets 01/09/2024 227     Glucose 01/09/2024 103 (H)     Sodium 01/09/2024 144     Potassium 01/09/2024 4.6     Chloride 01/09/2024 107     Bicarbonate 01/09/2024  29     Anion Gap 01/09/2024 13     Urea Nitrogen 01/09/2024 16     Creatinine 01/09/2024 1.25 (H)     eGFR 01/09/2024 44 (L)     Calcium 01/09/2024 9.6     Albumin 01/09/2024 4.1     Alkaline Phosphatase 01/09/2024 99     Total Protein 01/09/2024 7.6     AST 01/09/2024 15     Bilirubin, Total 01/09/2024 0.4     ALT 01/09/2024 11     Hemoglobin A1C 01/09/2024 6.2 (H)     Estimated Average Glucose 01/09/2024 131     Albumin, Urine Random 01/09/2024 7.2     Creatinine, Urine Random 01/09/2024 98.9     Albumin/Creatine Ratio 01/09/2024 7.3     Cholesterol 01/09/2024 147     HDL-Cholesterol 01/09/2024 56.2     Cholesterol/HDL Ratio 01/09/2024 2.6     LDL Calculated 01/09/2024 73     VLDL 01/09/2024 17     Triglycerides 01/09/2024 87     Non HDL Cholesterol 01/09/2024 91      Lab Results   Component Value Date     01/09/2024     02/17/2023     12/20/2022     11/18/2022    K 4.6 01/09/2024    K 4.4 02/17/2023    K 3.9 12/20/2022    K 4.3 11/18/2022    CO2 29 01/09/2024    CO2 29 02/17/2023    CO2 19 (L) 12/20/2022    CO2 24 11/18/2022    BUN 16 01/09/2024    BUN 16 02/17/2023    BUN 13 12/20/2022    BUN 9 11/18/2022    CREATININE 1.25 (H) 01/09/2024    CREATININE 1.39 (H) 02/17/2023    CREATININE 1.11 (H) 12/20/2022    CREATININE 1.15 (H) 11/18/2022    GLUCOSE 103 (H) 01/09/2024    GLUCOSE 110 (H) 02/17/2023    GLUCOSE 64 (L) 12/20/2022    GLUCOSE 88 11/18/2022    CALCIUM 9.6 01/09/2024    CALCIUM 9.3 02/17/2023    CALCIUM 8.1 (L) 12/20/2022    CALCIUM 9.4 11/18/2022     Lab Results   Component Value Date    WBC 4.2 (L) 01/09/2024    HGB 10.3 (L) 01/09/2024    HCT 33.1 (L) 01/09/2024    MCV 81 01/09/2024     01/09/2024     Lab Results   Component Value Date    CHOL 147 01/09/2024    TRIG 87 01/09/2024    HDL 56.2 01/09/2024     Assessment/Plan   Problem List Items Addressed This Visit             ICD-10-CM    Depression with anxiety F41.8    Cognitive disorder F09     Continue Cymbalta 60 mg  po daily   Follow up in 3-4 months or sooner if needed

## 2024-06-17 ENCOUNTER — APPOINTMENT (OUTPATIENT)
Dept: PRIMARY CARE | Facility: CLINIC | Age: 79
End: 2024-06-17
Payer: MEDICARE

## 2024-06-17 VITALS
TEMPERATURE: 98.3 F | DIASTOLIC BLOOD PRESSURE: 75 MMHG | OXYGEN SATURATION: 94 % | HEART RATE: 86 BPM | BODY MASS INDEX: 27.82 KG/M2 | HEIGHT: 65 IN | SYSTOLIC BLOOD PRESSURE: 118 MMHG | WEIGHT: 167 LBS

## 2024-06-17 DIAGNOSIS — H91.93 BILATERAL HEARING LOSS, UNSPECIFIED HEARING LOSS TYPE: ICD-10-CM

## 2024-06-17 DIAGNOSIS — E11.9 TYPE 2 DIABETES MELLITUS WITHOUT COMPLICATION, WITHOUT LONG-TERM CURRENT USE OF INSULIN (MULTI): ICD-10-CM

## 2024-06-17 DIAGNOSIS — M06.9 RHEUMATOID ARTHRITIS, INVOLVING UNSPECIFIED SITE, UNSPECIFIED WHETHER RHEUMATOID FACTOR PRESENT (MULTI): Primary | ICD-10-CM

## 2024-06-17 PROCEDURE — 1159F MED LIST DOCD IN RCRD: CPT

## 2024-06-17 PROCEDURE — 99204 OFFICE O/P NEW MOD 45 MIN: CPT

## 2024-06-17 PROCEDURE — 3078F DIAST BP <80 MM HG: CPT

## 2024-06-17 PROCEDURE — 1036F TOBACCO NON-USER: CPT

## 2024-06-17 PROCEDURE — 3074F SYST BP LT 130 MM HG: CPT

## 2024-06-17 PROCEDURE — 1126F AMNT PAIN NOTED NONE PRSNT: CPT

## 2024-06-17 ASSESSMENT — PAIN SCALES - GENERAL: PAINLEVEL: 0-NO PAIN

## 2024-06-17 NOTE — PROGRESS NOTES
I saw and evaluated the patient. I personally obtained the key and critical portions of the history and physical exam or was physically present for key and critical portions performed by the resident/fellow. I reviewed the resident/fellow's documentation and discussed the patient with the resident/fellow. I agree with the resident/fellow's medical decision making as documented in the note.    Oscar Mahmood MD

## 2024-06-17 NOTE — PROGRESS NOTES
Subjective   Patient ID: Toyin Oropeza is a 79 y.o. female who presents for Establish Care.    HPI  Hx bilateral hearing loss, depression and anxiety , ALEJANDRO, RA, DM2 7.7 (May 2024), vertigo,    #Inability to care for self  Asssited Living  - hx Falls, RA knees/back,   -Wants to go to new assisted living  Lives in same apartment as brother  -ambulates with rollator. Reports 3 falls in last 12 months  - can feed self with specialized utensils  - reports incontinent  - needs help with dressing, bathing,  - able to handle finances  - brother handles grocery shopping  -reports losing medications due to severe RA. Often forgets to take medications    #hearing loss  -Cerumen removal 5/16/24  - follow up with audiology?    #RA  -jouints MCP,PIP, wrists,  knees  - takes hydroxchlroqine   - needs rheumatologist    #HTN  -/75  - metoprolol 25mg BID   - amldopuien 10mg     #DM2  - A1C 7.7   - metformin    #Urinary incontinence  - takes oxybutynin    #Depression  #anxiety   #Mild cognitive impairment  -Last MoCA: 21/30  - stress from inability to perform ADLs, family members passing away  -After ED visiti for SOB, tachycardia She was given instructions on how to follow-up and pursue assisted living as an outpatient.   - takes Cymbalta 60mg every day   - follows with Dr. Mir     Review of Systems  As per hpi    Previous history  Past Medical History:   Diagnosis Date    Anemia     Asthma ([facility])     Encounter for general adult medical examination without abnormal findings     Routine history and physical examination of adult    Hypertension     Other conditions influencing health status 02/26/2013    Type II diabetes mellitus with peripheral circulatory disorder, uncontrolled    Personal history of other diseases of the musculoskeletal system and connective tissue     History of arthritis    Pure hypercholesterolemia, unspecified     High cholesterol    Rheumatoid arteritis ([facility])      Past Surgical History:    Procedure Laterality Date    BACK SURGERY  04/06/2015    Back Surgery    BREAST BIOPSY  01/14/2014    Biopsy Breast Percutaneous Needle Core    COLONOSCOPY  07/02/2013    Complete Colonoscopy    OTHER SURGICAL HISTORY  08/13/2021    Cataract surgery     Social History     Tobacco Use    Smoking status: Former     Types: Cigarettes    Smokeless tobacco: Never   Vaping Use    Vaping status: Never Used   Substance Use Topics    Alcohol use: Not Currently    Drug use: Never     Family History   Problem Relation Name Age of Onset    Diabetes Mother      Heart failure Mother      Hypertension Mother      Lung cancer Father      Cancer Father      Throat cancer Brother      Arthritis Other      Cancer Other      Diabetes Other      Hypertension Other       Allergies   Allergen Reactions    Atorvastatin Swelling    Citrus And Derivatives Unknown    Lisinopril Swelling     Current Outpatient Medications   Medication Instructions    albuterol 90 mcg/actuation inhaler 2 puffs, inhalation, 4 times daily PRN    amLODIPine (NORVASC) 10 mg, oral, Daily    atorvastatin (LIPITOR) 40 mg, oral, Every evening    DULoxetine (Cymbalta) 60 mg DR capsule TAKE 1 CAPSULE BY MOUTH ONCE DAILY. DO NOT CRUSH OR CHEW. START ON WEEK 2 AND THEREAFTER    ferrous sulfate, 325 mg ferrous sulfate, tablet 1 tablet, oral, Daily    hydroxychloroquine (PLAQUENIL) 200 mg, oral, 2 times daily (morning and late afternoon)    leflunomide (ARAVA) 10 mg, oral, Daily    meclizine (ANTIVERT) 12.5 mg, oral, 3 times daily PRN    metFORMIN (GLUCOPHAGE) 500 mg, oral, Daily    metoprolol tartrate (LOPRESSOR) 25 mg, oral, 2 times daily    oxybutynin XL (DITROPAN-XL) 5 mg, oral, Daily    thiamine (VITAMIN B-1) 250 mg, oral, Daily       Objective       Physical Exam  Constitutional:       Comments: Frail. Ambulates with rollator. Difficulty hearing   HENT:      Head: Normocephalic and atraumatic.   Eyes:      General: No scleral icterus.     Conjunctiva/sclera:  Conjunctivae normal.   Cardiovascular:      Rate and Rhythm: Normal rate and regular rhythm.      Heart sounds: No murmur heard.     No friction rub. No gallop.   Pulmonary:      Effort: Pulmonary effort is normal. No respiratory distress.      Breath sounds: Normal breath sounds.   Abdominal:      General: There is no distension.      Palpations: Abdomen is soft.      Tenderness: There is no abdominal tenderness.   Musculoskeletal:         General: Swelling present. Normal range of motion.      Comments: Bilateral upper extremity swelling in MCP, PIP, wrists. Knee swelling noted as well   Skin:     General: Skin is warm and dry.   Neurological:      General: No focal deficit present.      Mental Status: She is alert and oriented to person, place, and time.   Psychiatric:         Mood and Affect: Mood normal.       Assessment/Plan   Toyin Oropeza is a 79 y.o. female who presents for the concerns below:    Problem List Items Addressed This Visit       Diabetes mellitus (Multi)    Relevant Orders    Referral to Podiatry    Referral to Ophthalmology    Hearing loss    Relevant Orders    Referral to Audiology    Rheumatoid arthritis (Multi) - Primary    Relevant Orders    Referral to Rheumatology     #Inability to care for self  #Falls  :: Patient working on finding Assisted Living  - Goals of assisted living: ADLs, iADLs  - Mar Beck - care coordinator [facility]. Patient to call with their contact information  - falls possibly due to severe RA. Continue to monitor for falls and survey for polypharmacy or alternate causes of falls.    #hearing loss  -Cerumen removal 5/16/24  - refer to audiology    #RA  -joints MCP,PIP, wrists,  knees  - takes hydroxychlorquine, leflunomide    - refer to rheumatology    #HTN  -/75  - metoprolol 25mg BID   - amlodipine 10mg     #DM2  - A1C 7.7 (May 2024)  - c/w metformin 500mg every day   - refer to podiatry and ophthalmology    #Urinary incontinence  - takes  oxybutynin    #Depression  #anxiety   #Mild cognitive impairment  -Last MoCA: 21/30  - c/w Cymbalta 60mg every day   - follows with Dr. Mir     Discussed with:  Dr. Mahmood  Return in : 1 month    Portions of this note were generated using digital voice recognition software, and may contain grammatical errors       Negrito Madden, DO  PGY-2, Family Medicine

## 2024-06-17 NOTE — PROGRESS NOTES
PRECEPTING NOTE:    Patient was seen in [facility] residency clinic today as part of a multidisciplinary teaching team. I participated in this patient's care as a larry precepting physician.     Agree with Resident and/or Medical student plan to establish care today with addressing chronic conditions today.     Patient discussed with attending, Dr. Ela Jade MD  Family Medicine, PGY-3

## 2024-06-27 DIAGNOSIS — E78.00 HYPERCHOLESTEROLEMIA: ICD-10-CM

## 2024-06-27 NOTE — TELEPHONE ENCOUNTER
Pt called requesting refill of meclizine 12.5mg, oxybutynin, amlodipine, and hydroxychlorquine. Orders pended and routed to provider.

## 2024-07-01 DIAGNOSIS — E78.00 HYPERCHOLESTEROLEMIA: ICD-10-CM

## 2024-07-01 RX ORDER — AMLODIPINE BESYLATE 10 MG/1
10 TABLET ORAL DAILY
Qty: 90 TABLET | Refills: 2 | Status: SHIPPED | OUTPATIENT
Start: 2024-07-01

## 2024-07-01 RX ORDER — HYDROXYCHLOROQUINE SULFATE 200 MG/1
200 TABLET, FILM COATED ORAL
Qty: 60 TABLET | Refills: 1 | OUTPATIENT
Start: 2024-07-01

## 2024-07-01 RX ORDER — OXYBUTYNIN CHLORIDE 5 MG/1
5 TABLET, EXTENDED RELEASE ORAL DAILY
Qty: 90 TABLET | Refills: 2 | Status: SHIPPED | OUTPATIENT
Start: 2024-07-01

## 2024-07-01 NOTE — TELEPHONE ENCOUNTER
Call placed to pt to inform refills requested sent to pharmacy EXCEPT hydroxychlorquine. Pt must get refill via rheumatologist. Message sent to pt rheumatologist for refill until appt Aug 21st. Pt informed nurse she will be moving to a AL facility and Marii Jaramillo has been trying to reach clinic to obtain pt med list. Pt gave nurse contact number for Mrs Jaramillo; [phone]. Nurse to F/U with Clint and pt in AM.

## 2024-07-02 ENCOUNTER — DOCUMENTATION (OUTPATIENT)
Dept: PRIMARY CARE | Facility: CLINIC | Age: 79
End: 2024-07-02
Payer: MEDICARE

## 2024-07-02 ENCOUNTER — TELEPHONE (OUTPATIENT)
Dept: PRIMARY CARE | Facility: CLINIC | Age: 79
End: 2024-07-02
Payer: MEDICARE

## 2024-07-02 RX ORDER — ASPIRIN 81 MG/1
81 TABLET ORAL DAILY
COMMUNITY

## 2024-07-02 RX ORDER — HYDROXYCHLOROQUINE SULFATE 200 MG/1
200 TABLET, FILM COATED ORAL
Qty: 60 TABLET | Refills: 1 | OUTPATIENT
Start: 2024-07-02

## 2024-07-02 RX ORDER — VIT C/E/ZN/COPPR/LUTEIN/ZEAXAN 250MG-90MG
25 CAPSULE ORAL DAILY
COMMUNITY

## 2024-07-02 NOTE — PROGRESS NOTES
Call placed to Marii Jaramillo r/t need of documentation to move pt into AL community. Transferred to [place]'s voicemail. Message left to return nurse call. Call placed to pt to make aware nurse reached out to community for F/U and awaiting callback. Pt informed nurse that she just hung up with Clint and was informed the only rx needed is for hydroxychlorquine, which must be filled via rheumatologist. Pt appt scheduled August 21st with rheumatology was rescheduled for this upcoming Monday.

## 2024-07-02 NOTE — PROGRESS NOTES
Call placed to Marii Shi r/t clarification of needed documents. Confirmed in need of pt full hx/ physical/ med list. Pt to come to clinic tomorrow to sign release. Documentation being prepared, and to be faxed to [phone].

## 2024-07-02 NOTE — TELEPHONE ENCOUNTER
Call placed to pt to assist in reconciling current meds. Pt confirmed currently taking all meds on med list, and adds ASA 81mg PO daily and Vit D3 1000 IU PO daily. Changes to med list discussed with a provider. Pt med list updated and printed for fax following signing of release.

## 2024-07-08 ENCOUNTER — APPOINTMENT (OUTPATIENT)
Dept: RHEUMATOLOGY | Facility: CLINIC | Age: 79
End: 2024-07-08
Payer: MEDICARE

## 2024-07-08 ENCOUNTER — LAB (OUTPATIENT)
Dept: LAB | Facility: LAB | Age: 79
End: 2024-07-08
Payer: MEDICARE

## 2024-07-08 VITALS — HEART RATE: 79 BPM | SYSTOLIC BLOOD PRESSURE: 112 MMHG | DIASTOLIC BLOOD PRESSURE: 74 MMHG

## 2024-07-08 DIAGNOSIS — M81.0 OSTEOPOROSIS, UNSPECIFIED OSTEOPOROSIS TYPE, UNSPECIFIED PATHOLOGICAL FRACTURE PRESENCE: ICD-10-CM

## 2024-07-08 DIAGNOSIS — D64.9 ANEMIA, UNSPECIFIED TYPE: ICD-10-CM

## 2024-07-08 DIAGNOSIS — M05.79 RHEUMATOID ARTHRITIS INVOLVING MULTIPLE SITES WITH POSITIVE RHEUMATOID FACTOR (MULTI): ICD-10-CM

## 2024-07-08 DIAGNOSIS — M81.0 OSTEOPOROSIS, UNSPECIFIED OSTEOPOROSIS TYPE, UNSPECIFIED PATHOLOGICAL FRACTURE PRESENCE: Primary | ICD-10-CM

## 2024-07-08 DIAGNOSIS — M06.9 RHEUMATOID ARTHRITIS, INVOLVING UNSPECIFIED SITE, UNSPECIFIED WHETHER RHEUMATOID FACTOR PRESENT (MULTI): ICD-10-CM

## 2024-07-08 DIAGNOSIS — E78.00 HYPERCHOLESTEROLEMIA: ICD-10-CM

## 2024-07-08 PROCEDURE — 99214 OFFICE O/P EST MOD 30 MIN: CPT | Performed by: STUDENT IN AN ORGANIZED HEALTH CARE EDUCATION/TRAINING PROGRAM

## 2024-07-08 PROCEDURE — 80053 COMPREHEN METABOLIC PANEL: CPT

## 2024-07-08 PROCEDURE — 1159F MED LIST DOCD IN RCRD: CPT | Performed by: STUDENT IN AN ORGANIZED HEALTH CARE EDUCATION/TRAINING PROGRAM

## 2024-07-08 PROCEDURE — 3074F SYST BP LT 130 MM HG: CPT | Performed by: STUDENT IN AN ORGANIZED HEALTH CARE EDUCATION/TRAINING PROGRAM

## 2024-07-08 PROCEDURE — 85652 RBC SED RATE AUTOMATED: CPT

## 2024-07-08 PROCEDURE — 81381 HLA I TYPING 1 ALLELE HR: CPT

## 2024-07-08 PROCEDURE — 86140 C-REACTIVE PROTEIN: CPT

## 2024-07-08 PROCEDURE — 36415 COLL VENOUS BLD VENIPUNCTURE: CPT

## 2024-07-08 PROCEDURE — 1036F TOBACCO NON-USER: CPT | Performed by: STUDENT IN AN ORGANIZED HEALTH CARE EDUCATION/TRAINING PROGRAM

## 2024-07-08 PROCEDURE — 3078F DIAST BP <80 MM HG: CPT | Performed by: STUDENT IN AN ORGANIZED HEALTH CARE EDUCATION/TRAINING PROGRAM

## 2024-07-08 PROCEDURE — 85025 COMPLETE CBC W/AUTO DIFF WBC: CPT

## 2024-07-08 PROCEDURE — 81490 AUTOIMMUNE RA ALYS 12 BMRK: CPT

## 2024-07-08 PROCEDURE — 83550 IRON BINDING TEST: CPT

## 2024-07-08 PROCEDURE — 84550 ASSAY OF BLOOD/URIC ACID: CPT

## 2024-07-08 PROCEDURE — 83540 ASSAY OF IRON: CPT

## 2024-07-08 PROCEDURE — 82728 ASSAY OF FERRITIN: CPT

## 2024-07-08 RX ORDER — HYDROXYCHLOROQUINE SULFATE 200 MG/1
TABLET, FILM COATED ORAL
Qty: 135 TABLET | Refills: 1 | Status: SHIPPED | OUTPATIENT
Start: 2024-07-08

## 2024-07-08 NOTE — PROGRESS NOTES
Subjective   Patient ID: Toyin Oropeza is a 79 y.o. female who presents for Follow-up (Patient in today for a routine follow up. C/o rt knee pain.).  HPI:  F with OA and RA with felty, osteopenia, chronic anemia - stable with severe damage but no obvious active dz. on HCQ and LEF; hx of humira     Objective   /74 (BP Location: Right arm, Patient Position: Sitting, BP Cuff Size: Large adult)   Pulse 79       Physical Exam  Constitutional: Alert and in no acute distress. Well developed, well nourished  Head and Face: Head and face: Normal.    Cardiovascular: Heart rate and rhythm were normal, normal S1 and S2. No peripheral edema.   Pulmonary: No respiratory distress. Clear bilateral breath sounds.  Musculoskeletal: R knee aceves than left, hand with chronic rheumatoid changes  Skin: Normal skin color and pigmentation, normal skin turgor, and no rash.    Psychiatric: Judgment and insight: Intact. Mood and affect: Normal.     Lab Results   Component Value Date    WBC 4.2 (L) 01/09/2024    HGB 10.3 (L) 01/09/2024    HCT 33.1 (L) 01/09/2024     01/09/2024    ALT 11 01/09/2024    AST 15 01/09/2024    CREATININE 1.25 (H) 01/09/2024          Lab Results   Component Value Date    SEDRATE 86 (H) 10/18/2022    CRP 0.71 10/13/2021   \\            There is currently no information documented on the homunculus. Go to the Rheumatology activity and complete the homunculus joint exam.      Electrocardiogram 12 Lead  Please see ED Provider Note for formal interpretation  Confirmed by Jarek Vick ([id number]) on 12/21/2022 12:30:52 AM      === 02/10/23 ===    BONE DENSITY    Assessment/Plan:       #OA and RA   -continue arava 10 mg daily- 6 month supply will be sent 7/9/2024 tomorrow after I see labs  -risks have been discussed before  -Risks of leflunomide discussed including but not limited to infection, diarrhea, nausea, stomach pain, hair loss, abnormal liver function tests, cytopenias. Patient understanding and accepting  of risks.   -continue 1.5 tabs daily or 2 tabs and 1 tab every other day of plaquenil due to weight based dose- she has not been seeing ophtho for this , she has an upcoming appt and discused SE of HCQ including retinopathy  -6 month supply rx of HCQ rx 7/2024  -Recommended she use Voltaren gel over her knees to liberally  -vectra today for DA  -Also check uric acid and HLA-B*5801 as patient stated sometimes her right knee swells       #Osteopenia  -on vit D, I counseled her to either add a daily calcium supplement, or eat a heavy calcium diet  -repeat dexa due 2/2025-begin any machine as her last one was done at [facility] and we have since replaced her machine, I ordered this July 2024     #Preventative Health Recommendations for Primary Care Providers     Vaccine Recommendations:     From ACR 2022 vaccine recommendations:     For Rheumatic and musculoskeletal disease (RMD) patients aged greater than or equal to 65 years, and RMD patients aged >18 and <65 years who are on immunosuppressive medication, giving high-dose or adjuvanted influenza vaccination is conditionally  recommended over giving regular-dose influenza vaccination.     For patients with RMD aged <65 years who are on immunosuppressive medication, pneumococcal vaccination is strongly recommended.     For patients with RMD aged >18 years who are on immunosuppressive medication, administering the recombinant zoster vaccine is strongly recommended.     For patients with RMD aged >26 and <45 years who are on immunosuppressive medication and not previously vaccinated, vaccination against HPV is conditionally recommended.     Cardiovascular Recommendations:     Patients with inflammatory diseases are at high risk for cardiovascular disease, and should be aggressively screened by primary care physicians and started on lipid-lowering medications when appropriate.     Bone Health Recommendations:     Patients on steroids should be on  calcium and Vitamin D. Patients with inflammatory rheumatic diseases are higher risk for osteoporosis and should have baseline DEXA screening.      Patient counseled to seek medical care if any new or worsening symptoms, urgently if needed.      Note will be sent to primary care doctor.     Return to clinic 3-6 mo with lab before. Lab today     Dragon dictation software was used to dictate this note. Errors may have occurred during dictation that was not intended by the user.

## 2024-07-08 NOTE — PATIENT INSTRUCTIONS
Please get a bone density 2/2025    Please call central scheduling for radiology to schedule your imaging- [phone]    Please get lab today    I will refill meds after I see labs - tomorrow    Please followup in 3-6 months with labs before    My nurse will call you tomorrow to give you the specific date and time to followup with me

## 2024-07-09 ENCOUNTER — TELEPHONE (OUTPATIENT)
Dept: RHEUMATOLOGY | Facility: CLINIC | Age: 79
End: 2024-07-09
Payer: MEDICARE

## 2024-07-09 DIAGNOSIS — M06.9 RHEUMATOID ARTHRITIS, INVOLVING UNSPECIFIED SITE, UNSPECIFIED WHETHER RHEUMATOID FACTOR PRESENT (MULTI): ICD-10-CM

## 2024-07-09 LAB
ALBUMIN SERPL BCP-MCNC: 4.1 G/DL (ref 3.4–5)
ALP SERPL-CCNC: 123 U/L (ref 33–136)
ALT SERPL W P-5'-P-CCNC: 12 U/L (ref 7–45)
ANION GAP SERPL CALC-SCNC: 14 MMOL/L (ref 10–20)
AST SERPL W P-5'-P-CCNC: 13 U/L (ref 9–39)
BASOPHILS # BLD AUTO: 0.07 X10*3/UL (ref 0–0.1)
BASOPHILS NFR BLD AUTO: 1.3 %
BILIRUB SERPL-MCNC: 0.2 MG/DL (ref 0–1.2)
BUN SERPL-MCNC: 20 MG/DL (ref 6–23)
CALCIUM SERPL-MCNC: 9.7 MG/DL (ref 8.6–10.6)
CHLORIDE SERPL-SCNC: 104 MMOL/L (ref 98–107)
CO2 SERPL-SCNC: 27 MMOL/L (ref 21–32)
CREAT SERPL-MCNC: 1.2 MG/DL (ref 0.5–1.05)
CRP SERPL-MCNC: 1.67 MG/DL
EGFRCR SERPLBLD CKD-EPI 2021: 46 ML/MIN/1.73M*2
EOSINOPHIL # BLD AUTO: 0.3 X10*3/UL (ref 0–0.4)
EOSINOPHIL NFR BLD AUTO: 5.5 %
ERYTHROCYTE [DISTWIDTH] IN BLOOD BY AUTOMATED COUNT: 16.4 % (ref 11.5–14.5)
ERYTHROCYTE [SEDIMENTATION RATE] IN BLOOD BY WESTERGREN METHOD: 105 MM/H (ref 0–30)
FERRITIN SERPL-MCNC: 112 NG/ML (ref 8–150)
GLUCOSE SERPL-MCNC: 149 MG/DL (ref 74–99)
HCT VFR BLD AUTO: 35.4 % (ref 36–46)
HGB BLD-MCNC: 10.7 G/DL (ref 12–16)
IMM GRANULOCYTES # BLD AUTO: 0.02 X10*3/UL (ref 0–0.5)
IMM GRANULOCYTES NFR BLD AUTO: 0.4 % (ref 0–0.9)
IRON SATN MFR SERPL: 15 % (ref 25–45)
IRON SERPL-MCNC: 48 UG/DL (ref 35–150)
LYMPHOCYTES # BLD AUTO: 1.74 X10*3/UL (ref 0.8–3)
LYMPHOCYTES NFR BLD AUTO: 32.2 %
MCH RBC QN AUTO: 24.3 PG (ref 26–34)
MCHC RBC AUTO-ENTMCNC: 30.2 G/DL (ref 32–36)
MCV RBC AUTO: 81 FL (ref 80–100)
MONOCYTES # BLD AUTO: 0.52 X10*3/UL (ref 0.05–0.8)
MONOCYTES NFR BLD AUTO: 9.6 %
NEUTROPHILS # BLD AUTO: 2.76 X10*3/UL (ref 1.6–5.5)
NEUTROPHILS NFR BLD AUTO: 51 %
NRBC BLD-RTO: 0 /100 WBCS (ref 0–0)
PLATELET # BLD AUTO: 262 X10*3/UL (ref 150–450)
POTASSIUM SERPL-SCNC: 4.6 MMOL/L (ref 3.5–5.3)
PROT SERPL-MCNC: 7.9 G/DL (ref 6.4–8.2)
RBC # BLD AUTO: 4.4 X10*6/UL (ref 4–5.2)
SODIUM SERPL-SCNC: 140 MMOL/L (ref 136–145)
TIBC SERPL-MCNC: 311 UG/DL (ref 240–445)
UIBC SERPL-MCNC: 263 UG/DL (ref 110–370)
URATE SERPL-MCNC: 5.2 MG/DL (ref 2.3–6.7)
WBC # BLD AUTO: 5.4 X10*3/UL (ref 4.4–11.3)

## 2024-07-09 RX ORDER — LEFLUNOMIDE 10 MG/1
10 TABLET ORAL DAILY
Qty: 90 TABLET | Refills: 1 | Status: SHIPPED | OUTPATIENT
Start: 2024-07-09

## 2024-07-10 ENCOUNTER — APPOINTMENT (OUTPATIENT)
Dept: PRIMARY CARE | Facility: CLINIC | Age: 79
End: 2024-07-10
Payer: MEDICARE

## 2024-07-11 LAB — HLA-B*58:01 BLD/T QL: NEGATIVE

## 2024-07-17 ENCOUNTER — CLINICAL SUPPORT (OUTPATIENT)
Dept: AUDIOLOGY | Facility: HOSPITAL | Age: 79
End: 2024-07-17
Payer: MEDICARE

## 2024-07-17 DIAGNOSIS — H90.3 SENSORINEURAL HEARING LOSS (SNHL) OF BOTH EARS: ICD-10-CM

## 2024-07-17 PROCEDURE — 92557 COMPREHENSIVE HEARING TEST: CPT | Performed by: AUDIOLOGIST

## 2024-07-17 PROCEDURE — 92567 TYMPANOMETRY: CPT | Performed by: AUDIOLOGIST

## 2024-07-17 NOTE — PROGRESS NOTES
"AUDIOLOGY ADULT AUDIOMETRIC EVALUATION      Name:  Toyin Oropeza   :  1945  Age:  79 y.o.  Date of Evaluation:  2024    Time: 6103-3241    IMPRESSIONS     Moderate to severe sensorineural hearing loss 125-8000 Hz with asymmetry noted at 8318-8657 Hz, right ear worse.  Word understanding in quiet is good in both ears.  Tympanometry indicates normal middle ear pressure and tympanic membrane mobility in both ears.     Amplification needs: Patient has hearing aids, unsure of age. She reports dissatisfaction with the aids. She was encouraged to return to dispensing office with today's audiogram for reprogramming.    RECOMMENDATIONS     Continue medical follow up with primary care provider and/or Ears Nose and Throat (ENT) provider as recommended.  Return for audiologic evaluation annually to monitor hearing sensitivity and assess middle ear status or sooner should concerns arise. The audiology department can be reached at [phone] to schedule an appointment.   Follow up with managing audiologist for hearing aid programming and cleaning. Patient was provided with a copy of today's audiogram.    HISTORY     Reason for visit:  Ms. Oropeza is seen today for a follow-up audiologic evaluation at the request of Radha Mir MD due to known hearing loss bilaterally. Previous audio was performed on 21 and revealed bilateral hearing loss with slight asymmetry at 5658-6049 Hz, right ear worse. She saw Sarah Miller CNP on 24 for cerumen removal. History obtained from patient report and chart review.    EVALUATION     See scanned audiogram in \"Media\"     TEST RESULTS     Otoscopic Evaluation:  Right Ear: Cerumen which appeared to be non-occluding; however, tympanic membrane could not be visualized. Testing continued given tympanometry results.  Left Ear: Ear canal clear.    Tympanometry (226 Hz):  Right Ear: Type A, middle ear pressure and tympanic membrane compliance within normal limits.   Left " Ear: Type A, middle ear pressure and tympanic membrane compliance within normal limits.     Acoustic Reflexes:   Right Ear: Did not test.  Left Ear: Could not test as hermetic seal could not be maintained.    Test technique:  Pure Tone Audiometry via headphones  Reliability: Good    Pure Tone Audiometry:    Right Ear: Moderate sloping to severe sensorineural hearing loss with air-bone gap noted at 4000 Hz.  Left Ear: Moderate sloping to severe sensorineural hearing loss.    Speech Audiometry:   Right Ear:  Speech Reception Threshold (SRT) was obtained at 55 dB HL. Word Recognition scores were good (88%) in quiet when words were presented at 90 dB HL. These results are based on [facility] Auditory Test No.6 (NU-6) (N=25).   Left Ear:  Speech Reception Threshold (SRT) was obtained at 55 dB HL. Word Recognition scores were good (80%) in quiet when words were presented at 90 dB HL. These results are based on [facility] Auditory Test No.6 (NU-6) (N=25).     Comparison of today's results with previous test results: Stable thresholds in the right ear and progression in the left ear at 6281-6631 Hz since last evaluation on 7/12/21         PATIENT EDUCATION     Discussed results and recommendations with Ms. Oropeza and her brother. Questions were addressed and the patient was encouraged to contact our department at [phone] should concerns arise.       Richard Poole, CCC-A  Clinical Audiologist    Degree of   Hearing Sensitivity dB Range   Within Normal Limits (WNL) 0 - 20   Slight 25   Mild 26 - 40   Moderate 41 - 55   Moderately-Severe 56 - 70   Severe 71 - 90   Profound 91 +     Key   CHL Conductive Hearing Loss   ECV Ear Canal Volume   HA Hearing Aid   NIHL Noise-Induced Hearing Loss   PTA Pure Tone Average   SNHL Sensorineural Hearing Loss   TM Tympanic Membrane   WNL Within Normal Limits

## 2024-07-22 LAB — SCAN RESULT: NORMAL

## 2024-08-06 ENCOUNTER — APPOINTMENT (OUTPATIENT)
Dept: PRIMARY CARE | Facility: CLINIC | Age: 79
End: 2024-08-06
Payer: MEDICARE

## 2024-08-21 ENCOUNTER — APPOINTMENT (OUTPATIENT)
Dept: RHEUMATOLOGY | Facility: CLINIC | Age: 79
End: 2024-08-21
Payer: MEDICARE

## 2024-09-09 ENCOUNTER — APPOINTMENT (OUTPATIENT)
Dept: BEHAVIORAL HEALTH | Facility: CLINIC | Age: 79
End: 2024-09-09
Payer: MEDICARE

## 2024-10-21 ENCOUNTER — APPOINTMENT (OUTPATIENT)
Dept: OPHTHALMOLOGY | Facility: CLINIC | Age: 79
End: 2024-10-21
Payer: MEDICARE

## 2024-10-21 PROBLEM — Z79.899 LONG-TERM USE OF PLAQUENIL: Status: ACTIVE | Noted: 2024-10-21

## 2024-10-21 PROBLEM — H25.812 COMBINED FORMS OF AGE-RELATED CATARACT, LEFT EYE: Status: RESOLVED | Noted: 2023-03-01 | Resolved: 2024-10-21

## 2024-10-21 PROBLEM — H25.813 COMBINED FORM OF AGE-RELATED CATARACT, BOTH EYES: Status: RESOLVED | Noted: 2023-03-01 | Resolved: 2024-10-21

## 2024-11-18 ENCOUNTER — APPOINTMENT (OUTPATIENT)
Dept: OTOLARYNGOLOGY | Facility: CLINIC | Age: 79
End: 2024-11-18
Payer: MEDICARE

## 2024-12-05 ENCOUNTER — TELEPHONE (OUTPATIENT)
Dept: CARE COORDINATION | Facility: CLINIC | Age: 79
End: 2024-12-05
Payer: MEDICARE

## 2024-12-05 NOTE — PROGRESS NOTES
Calling patient to schedule appointment at PCP office. Unable to leave voicemail at this time.       Patient is able to directly contact me if any issues are brought to the providers attention. My direct number is [phone].      [facility]  [address]  [facility] [address]  [place] [address]    Office Phone: [phone] Option 3  Patient Navigator: [phone]

## 2025-03-11 NOTE — PROGRESS NOTES
Berenice Lea is calling to request a refill on the following medication(s):    Medication Request:  Requested Prescriptions     Pending Prescriptions Disp Refills    oxyCODONE-acetaminophen (ENDOCET) 7.5-325 MG per tablet 240 tablet 0     Sig: Take 1 tablet by mouth every 3 hours as needed for Pain for up to 30 days. Intended supply: 30 days Max Daily Amount: 8 tablets    gabapentin (NEURONTIN) 300 MG capsule 180 capsule 3     Sig: Take 1 capsule by mouth nightly for 90 days.    busPIRone (BUSPAR) 10 MG tablet 120 tablet 3     Sig: Take 2 tablets by mouth in the morning and at bedtime    fluocinolone (DERMOTIC) 0.01 % OIL oil 20 mL 2     Sig: Place 5 drops in ear(s) at bedtime Affected Ear       Last Visit Date (If Applicable):  2/27/2025    Next Visit Date:    8/27/2025               "Attempt made to reach Ms. Oropeza at her listed contact number for [facility] outreach.  Received \"busy signal\" x2 attempts, will continue to follow-up for [facility] outreach at a later time.    LOV: 23, /85, HR 78, A1C 5.5%  NOV: 24 [facility] AWV     Treatment Goals     For high blood pressure:   Treatment goals include:  [place]/continue prudent diet and regular exercise.   Blood Pressure Treatment goals include:   BP of 120/80, with no higher than 140/85 on consistent basis.   Exercise at least 3-4 days a week for at least 30 minutes  Eat a balanced diet, rich in fruits and vegetables, low in sodium and processed foods.  If you are overweight, work toward a goal BMI of less than 25, with short-term goal of 10 pound weight loss.     For diabetes:   Treatment goals include:  HgbA1C less than 7.0%  Fasting B - 130 mg/dL  Postprandial BG: less than 180 mg/dL  /80 on consistent basis, with no higher than 140/85  LDL cholesterol less than 100, and HDL cholesterol above 40.  Yearly retinal exam  Check feet periodically for sores or decreased sensation  Exercise at least 3-4 days a week for at least 30 minutes  Work toward a goal BMI of less than 25, although in the shorter term, losing even 10 pounds will help.     For Cholesterol:   Treatment goals include:  HIGHER IN FRUITS AND VEGGIES AND WHOLE GRAIN AND LOWER IN FATTY FOODS.      Think about those things which may be affecting your ability to reach those goals, and develop a plan to overcome them.     Additional resources are available upon request to help you attain goals, including dietitian.     Sasha Read RN      "